# Patient Record
Sex: FEMALE | Race: WHITE | Employment: OTHER | URBAN - METROPOLITAN AREA
[De-identification: names, ages, dates, MRNs, and addresses within clinical notes are randomized per-mention and may not be internally consistent; named-entity substitution may affect disease eponyms.]

---

## 2017-03-22 ENCOUNTER — HOSPITAL ENCOUNTER (OUTPATIENT)
Dept: MAMMOGRAPHY | Age: 72
Discharge: HOME | End: 2017-03-22
Attending: FAMILY MEDICINE
Payer: MEDICARE

## 2017-03-22 DIAGNOSIS — Z12.31 VISIT FOR SCREENING MAMMOGRAM: ICD-10-CM

## 2017-03-22 PROCEDURE — 77067 SCR MAMMO BI INCL CAD: CPT

## 2018-11-16 ENCOUNTER — OFFICE VISIT (OUTPATIENT)
Dept: SURGERY | Age: 73
End: 2018-11-16

## 2018-11-16 VITALS
HEIGHT: 66 IN | DIASTOLIC BLOOD PRESSURE: 98 MMHG | HEART RATE: 69 BPM | TEMPERATURE: 97.2 F | WEIGHT: 204.7 LBS | RESPIRATION RATE: 20 BRPM | BODY MASS INDEX: 32.9 KG/M2 | OXYGEN SATURATION: 99 % | SYSTOLIC BLOOD PRESSURE: 169 MMHG

## 2018-11-16 DIAGNOSIS — L98.9 SKIN LESION OF RIGHT LOWER EXTREMITY: Primary | ICD-10-CM

## 2018-11-16 RX ORDER — POTASSIUM &MAGNESIUM ASPARTATE 250-250 MG
500 CAPSULE ORAL DAILY
COMMUNITY

## 2018-11-16 NOTE — PROGRESS NOTES
Surgery Consult:  Skin cancer  Requesting physician:  Dr. Eneida Marti    Subjective:   Patient 75 y.o.  female presents with right thigh skin lesion that she had for few years. Complains of intermittent bleeding. Patient is not sure whether the lesion has increased in size or not. Denies any trauma to this area.          Past Medical & Surgical History:  Past Medical History:   Diagnosis Date    Conjunctivitis     Edema     History of Bell's palsy 3/30/2010    Obesity, unspecified     paranoid schizophrenia     Parkinson's disease     Psychiatric disorder     schizoaffective    urinary incontinence     urinary incontinence    Varicose veins       Past Surgical History:   Procedure Laterality Date    HX DILATION AND CURETTAGE         Social History:  Social History     Socioeconomic History    Marital status:      Spouse name: Not on file    Number of children: Not on file    Years of education: Not on file    Highest education level: Not on file   Social Needs    Financial resource strain: Not on file    Food insecurity - worry: Not on file    Food insecurity - inability: Not on file   Qnekt needs - medical: Not on file   Qnekt needs - non-medical: Not on file   Occupational History    Not on file   Tobacco Use    Smoking status: Never Smoker    Smokeless tobacco: Never Used   Substance and Sexual Activity    Alcohol use: No    Drug use: Yes     Types: Prescription    Sexual activity: Not on file   Other Topics Concern     Service No    Blood Transfusions No    Caffeine Concern Not Asked    Occupational Exposure Not Asked    Hobby Hazards Not Asked    Sleep Concern Not Asked    Stress Concern Not Asked    Weight Concern Not Asked    Special Diet Not Asked    Back Care Not Asked    Exercise Not Asked    Bike Helmet Not Asked    Seat Belt Not Asked    Self-Exams Not Asked   Social History Narrative    Not on file        Family History:  Family History   Problem Relation Age of Onset    Breast Cancer Maternal Grandmother         Medications:  Current Outpatient Medications   Medication Sig    mirabegron (MYRBETRIQ PO) Take 25 mg by mouth daily.  cranberry 500 mg capsule Take 500 mg by mouth daily.  perphenazine (TRILAFON) 8 mg tablet Take 8 mg by mouth three (3) times daily. 16 mg in am     16 at 2 pm  8 mg hs     perphenazine (,TRILAFON,TABLET) 4 mg tablet Take 2 Tabs by mouth three (3) times daily. Takes 2 in AM, 2 in PM, and 2 at HS    fluoxetine (PROZAC) 20 mg capsule Take 60 mg by mouth daily. Take 3 at HS     benztropine (COGENTIN) 0.5 mg tablet Take 1 mg by mouth three (3) times daily. Takes 1 mg TID     amoxicillin-clavulanate (AUGMENTIN) 500-125 mg per tablet Take 1 Tab by mouth two (2) times a day.  HYDROcodone-acetaminophen (LORTAB 7.5) 7.5-500 mg per tablet Take 1 Tab by mouth every four (4) hours as needed for Pain.  guaiFENesin (ROBITUSSIN) 100 mg/5 mL liquid Take 100 mg by mouth three (3) times daily as needed.  CARBOXYMETHYLCELLULOS/GLYCERIN (OPTIVE OP) Apply  to eye three (3) times daily.  amitriptyline (ELAVIL) 75 mg tablet Take  by mouth nightly. No current facility-administered medications for this visit. Allergies: Allergies   Allergen Reactions    Haldol [Haloperidol Lactate] Other (comments)     Did not like how it made  Her feel       Review of Systems  A comprehensive review of systems was negative except for that written in the HPI.     Objective:     Exam:    Visit Vitals  BP (!) 169/98 (BP 1 Location: Left arm, BP Patient Position: Sitting)   Pulse 69   Temp 97.2 °F (36.2 °C) (Oral)   Resp 20   Ht 5' 6\" (1.676 m)   Wt 92.9 kg (204 lb 11.2 oz)   LMP  (LMP Unknown)   SpO2 99%   BMI 33.04 kg/m²     General appearance: alert, cooperative, no distress, appears stated age  Lungs: clear to auscultation bilaterally  Heart: regular rate and rhythm  Extremities: extremities normal, atraumatic, no cyanosis or edema. OCTAVIA. Skin: Skin color, texture, turgor normal.  Right anterior mid thigh with 1.5 x 1.5 cm friable non-blanching erythematous skin lesion with raised edges. Neurologic: Grossly normal    Assessment:     Right thigh skin lesion, suspicious for malignancy    Plan:     Plan for excisional biopsy  Risks, benefit, and alternative to surgery was discussed with the patient. The risks of surgery include but not limited to infection, bleeding, recurrence, and the risks of anesthetic. Possible need for re-excision based on final pathology also discussed. Patient is agreeable to surgery, but will call back to schedule surgery after checking with her power of . All questions answered.

## 2018-11-16 NOTE — H&P (VIEW-ONLY)
Surgery Consult:  Skin cancer Requesting physician:  Dr. Valentino rGeen Subjective:  
Patient 75 y.o.  female presents with right thigh skin lesion that she had for few years. Complains of intermittent bleeding. Patient is not sure whether the lesion has increased in size or not. Denies any trauma to this area. Past Medical & Surgical History: 
Past Medical History:  
Diagnosis Date  Conjunctivitis  Edema  History of Bell's palsy 3/30/2010  Obesity, unspecified  paranoid schizophrenia  Parkinson's disease  Psychiatric disorder   
 schizoaffective  urinary incontinence   
 urinary incontinence  Varicose veins Past Surgical History:  
Procedure Laterality Date  HX DILATION AND CURETTAGE Social History: 
Social History Socioeconomic History  Marital status:  Spouse name: Not on file  Number of children: Not on file  Years of education: Not on file  Highest education level: Not on file Social Needs  Financial resource strain: Not on file  Food insecurity - worry: Not on file  Food insecurity - inability: Not on file  Transportation needs - medical: Not on file  Transportation needs - non-medical: Not on file Occupational History  Not on file Tobacco Use  Smoking status: Never Smoker  Smokeless tobacco: Never Used Substance and Sexual Activity  Alcohol use: No  
 Drug use: Yes Types: Prescription  Sexual activity: Not on file Other Topics Concern   Service No  
 Blood Transfusions No  
 Caffeine Concern Not Asked  Occupational Exposure Not Asked [place] Hazards Not Asked  Sleep Concern Not Asked  Stress Concern Not Asked  Weight Concern Not Asked  Special Diet Not Asked  Back Care Not Asked  Exercise Not Asked  Bike Helmet Not Asked  Seat Belt Not Asked  Self-Exams Not Asked Social History Narrative  Not on file Family History: Family History Problem Relation Age of Onset  Breast Cancer Maternal Grandmother Medications: 
Current Outpatient Medications Medication Sig  
 mirabegron (MYRBETRIQ PO) Take 25 mg by mouth daily.  cranberry 500 mg capsule Take 500 mg by mouth daily.  perphenazine (TRILAFON) 8 mg tablet Take 8 mg by mouth three (3) times daily. 16 mg in am     16 at 2 pm  8 mg hs  perphenazine (,TRILAFON,TABLET) 4 mg tablet Take 2 Tabs by mouth three (3) times daily. Takes 2 in AM, 2 in PM, and 2 at HS  
 fluoxetine (PROZAC) 20 mg capsule Take 60 mg by mouth daily. Take 3 at HS   
 benztropine (COGENTIN) 0.5 mg tablet Take 1 mg by mouth three (3) times daily. Takes 1 mg TID  amoxicillin-clavulanate (AUGMENTIN) 500-125 mg per tablet Take 1 Tab by mouth two (2) times a day.  HYDROcodone-acetaminophen (LORTAB 7.5) 7.5-500 mg per tablet Take 1 Tab by mouth every four (4) hours as needed for Pain.  guaiFENesin (ROBITUSSIN) 100 mg/5 mL liquid Take 100 mg by mouth three (3) times daily as needed.  CARBOXYMETHYLCELLULOS/GLYCERIN (OPTIVE OP) Apply  to eye three (3) times daily.  amitriptyline (ELAVIL) 75 mg tablet Take  by mouth nightly. No current facility-administered medications for this visit. Allergies: Allergies Allergen Reactions  Haldol [Haloperidol Lactate] Other (comments) Did not like how it made  Her feel Review of Systems A comprehensive review of systems was negative except for that written in the HPI. Objective:  
 
Exam: 
 
Visit Vitals BP (!) 169/98 (BP 1 Location: Left arm, BP Patient Position: Sitting) Pulse 69 Temp 97.2 °F (36.2 °C) (Oral) Resp 20 Ht 5' 6\" (1.676 m) Wt 92.9 kg (204 lb 11.2 oz) LMP  (LMP Unknown) SpO2 99% BMI 33.04 kg/m² General appearance: alert, cooperative, no distress, appears stated age Lungs: clear to auscultation bilaterally Heart: regular rate and rhythm Extremities: extremities normal, atraumatic, no cyanosis or edema. OCTAVIA. Skin: Skin color, texture, turgor normal.  Right anterior mid thigh with 1.5 x 1.5 cm friable non-blanching erythematous skin lesion with raised edges. Neurologic: Grossly normal 
 
Assessment:  
 
Right thigh skin lesion, suspicious for malignancy Plan:  
 
Plan for excisional biopsy Risks, benefit, and alternative to surgery was discussed with the patient. The risks of surgery include but not limited to infection, bleeding, recurrence, and the risks of anesthetic. Possible need for re-excision based on final pathology also discussed. Patient is agreeable to surgery, but will call back to schedule surgery after checking with her power of . All questions answered.

## 2018-11-16 NOTE — PROGRESS NOTES
Chief Complaint   Patient presents with    Skin Problem     Pt seen @ the request of Dr. Bonnie Moreno to evaluate slin lesion right thigh. 1. Have you been to the ER, urgent care clinic since your last visit?no  Hospitalized since your last visit?no    2. Have you seen or consulted any other health care providers outside of the [address] [place] [address] since your last visit? Include any pap smears or colon screening. no     Discussed advanced directive. Patient states that she does  have an advanced directive.  [address]

## 2018-11-26 ENCOUNTER — TELEPHONE (OUTPATIENT)
Dept: SURGERY | Age: 73
End: 2018-11-26

## 2018-11-27 NOTE — TELEPHONE ENCOUNTER
Returned call to Ms [facility] . Left voice message. Surgery rescheduled to 12/6/18 for 10 AM. PAT rescheduled for 12/3/18 at 1 PM.    Ms Radha Lawrence, will call back to office with any scheduling conflict.

## 2018-11-29 DIAGNOSIS — Z01.818 PREOP TESTING: ICD-10-CM

## 2018-11-29 DIAGNOSIS — L98.9 SKIN LESION: ICD-10-CM

## 2018-11-29 DIAGNOSIS — L98.9 SKIN LESION: Primary | ICD-10-CM

## 2018-12-03 ENCOUNTER — HOSPITAL ENCOUNTER (OUTPATIENT)
Dept: NON INVASIVE DIAGNOSTICS | Age: 73
Discharge: HOME | End: 2018-12-03
Payer: MEDICARE

## 2018-12-03 DIAGNOSIS — Z01.818 PREOP TESTING: ICD-10-CM

## 2018-12-03 DIAGNOSIS — L98.9 SKIN LESION: ICD-10-CM

## 2018-12-03 LAB
ATRIAL RATE: 63 BPM
CALCULATED P AXIS, ECG09: 55 DEGREES
CALCULATED R AXIS, ECG10: -40 DEGREES
CALCULATED T AXIS, ECG11: 9 DEGREES
DIAGNOSIS, 93000: NORMAL
P-R INTERVAL, ECG05: 200 MS
Q-T INTERVAL, ECG07: 450 MS
QRS DURATION, ECG06: 80 MS
QTC CALCULATION (BEZET), ECG08: 460 MS
VENTRICULAR RATE, ECG03: 63 BPM

## 2018-12-03 PROCEDURE — 93005 ELECTROCARDIOGRAM TRACING: CPT

## 2018-12-04 LAB
BASOPHILS # BLD AUTO: 0.1 X10E3/UL (ref 0–0.2)
BASOPHILS NFR BLD AUTO: 1 %
BUN SERPL-MCNC: 25 MG/DL (ref 8–27)
BUN/CREAT SERPL: 18 (ref 12–28)
CALCIUM SERPL-MCNC: 9.2 MG/DL (ref 8.7–10.3)
CHLORIDE SERPL-SCNC: 102 MMOL/L (ref 96–106)
CO2 SERPL-SCNC: 23 MMOL/L (ref 20–29)
CREAT SERPL-MCNC: 1.37 MG/DL (ref 0.57–1)
EOSINOPHIL # BLD AUTO: 0.7 X10E3/UL (ref 0–0.4)
EOSINOPHIL NFR BLD AUTO: 9 %
ERYTHROCYTE [DISTWIDTH] IN BLOOD BY AUTOMATED COUNT: 12.9 % (ref 12.3–15.4)
GLUCOSE SERPL-MCNC: 101 MG/DL (ref 65–99)
HCT VFR BLD AUTO: 40.2 % (ref 34–46.6)
HGB BLD-MCNC: 13 G/DL (ref 11.1–15.9)
IMM GRANULOCYTES # BLD: 0 X10E3/UL (ref 0–0.1)
IMM GRANULOCYTES NFR BLD: 0 %
LYMPHOCYTES # BLD AUTO: 1.6 X10E3/UL (ref 0.7–3.1)
LYMPHOCYTES NFR BLD AUTO: 21 %
MCH RBC QN AUTO: 29.9 PG (ref 26.6–33)
MCHC RBC AUTO-ENTMCNC: 32.3 G/DL (ref 31.5–35.7)
MCV RBC AUTO: 92 FL (ref 79–97)
MONOCYTES # BLD AUTO: 0.8 X10E3/UL (ref 0.1–0.9)
MONOCYTES NFR BLD AUTO: 11 %
NEUTROPHILS # BLD AUTO: 4.5 X10E3/UL (ref 1.4–7)
NEUTROPHILS NFR BLD AUTO: 58 %
PLATELET # BLD AUTO: 261 X10E3/UL (ref 150–379)
POTASSIUM SERPL-SCNC: 4.5 MMOL/L (ref 3.5–5.2)
RBC # BLD AUTO: 4.35 X10E6/UL (ref 3.77–5.28)
SODIUM SERPL-SCNC: 143 MMOL/L (ref 134–144)
WBC # BLD AUTO: 7.7 X10E3/UL (ref 3.4–10.8)

## 2018-12-05 ENCOUNTER — ANESTHESIA EVENT (OUTPATIENT)
Dept: SURGERY | Age: 73
End: 2018-12-05
Payer: MEDICARE

## 2018-12-05 NOTE — ANESTHESIA PREPROCEDURE EVALUATION
Anesthetic History   No history of anesthetic complications            Review of Systems / Medical History  Patient summary reviewed, nursing notes reviewed and pertinent labs reviewed    Pulmonary  Within defined limits                 Neuro/Psych         Psychiatric history    Comments: Paranoid Schizophrenia  H/O Bell's Palsy  Parkinson's Disease Cardiovascular  Within defined limits                Exercise tolerance: >4 METS  Comments: Varicose Veins   GI/Hepatic/Renal               Comments: Urinary Incontinence Endo/Other        Obesity  Pertinent negatives: No morbid obesity   Other Findings   Comments: Right lower extremity skin lesion         Physical Exam    Airway  Mallampati: II  TM Distance: 4 - 6 cm  Neck ROM: normal range of motion   Mouth opening: Normal     Cardiovascular  Regular rate and rhythm,  S1 and S2 normal,  no murmur, click, rub, or gallop             Dental    Dentition: Edentulous     Pulmonary  Breath sounds clear to auscultation               Abdominal  GI exam deferred       Other Findings            Anesthetic Plan    ASA: 3  Anesthesia type: MAC and total IV anesthesia          Induction: Intravenous  Anesthetic plan and risks discussed with: Patient

## 2018-12-05 NOTE — PERIOP NOTES
Spoke w  Mrs. Beatriz Conroy. She unsure of patient medical history and meds, referred me to nurse @ [facility].

## 2018-12-05 NOTE — PERIOP NOTES
[facility]  Preoperative Instructions        Surgery Date 12/6/18          Time of Arrival 0845    1. On the day of your surgery, please report to the Surgical Services Registration Desk and sign in at your designated time. The Surgery Center is located to the right of the Emergency Room. 2. You must have someone with you to drive you home. You should not drive a car for 24 hours following surgery. Please make arrangements for a friend or family member to stay with you for the first 24 hours after your surgery. 3. Do not have anything to eat or drink (including water, gum, mints, coffee, juice) after midnight 12/5/18?? Clerance Riis ? This may not apply to medications prescribed by your physician. ?(Please note below the special instructions with medications to take the morning of your procedure.)    4. We recommend you do not drink any alcoholic beverages for 24 hours before and after your surgery. 5. Contact your surgeons office for instructions on the following medications: non-steroidal anti-inflammatory drugs (i.e. Advil, Aleve), vitamins, and supplements. (Some surgeons will want you to stop these medications prior to surgery and others may allow you to take them)  **If you are currently taking Plavix, Coumadin, Aspirin and/or other blood-thinning agents, contact your surgeon for instructions. ** Your surgeon will partner with the physician prescribing these medications to determine if it is safe to stop or if you need to continue taking. Please do not stop taking these medications without instructions from your surgeon    6. Wear comfortable clothes. Wear glasses instead of contacts. Do not bring any money or jewelry. Please bring picture ID, insurance card, and any prearranged co-payment or hospital payment. Do not wear make-up, particularly mascara the morning of your surgery. Do not wear nail polish, particularly if you are having foot /hand surgery.   Wear your hair loose or down, no ponytails, buns, troy pins or clips. All body piercings must be removed. Please shower with antibacterial soap for three consecutive days before and on the morning of surgery, but do not apply any lotions, powders or deodorants after the shower on the day of surgery. Please use a fresh towels after each shower. Please sleep in clean clothes and change bed linens the night before surgery. Please do not shave for 48 hours prior to surgery. Shaving of the face is acceptable. 7. You should understand that if you do not follow these instructions your surgery may be cancelled. If your physical condition changes (I.e. fever, cold or flu) please contact your surgeon as soon as possible. 8. It is important that you be on time. If a situation occurs where you may be late, please call [phone] (OR Holding Area). 9. If you have any questions and or problems, please call [phone] (Pre-admission Testing). 10. Your surgery time may be subject to change. You will receive a phone call the evening prior if your time changes. 11.  If having outpatient surgery, you must have someone to drive you here, stay with you during the duration of your stay, and to drive you home at time of discharge. 12.   In an effort to improve the efficiency, privacy, and safety for all of our Pre-op patients visitors are not allowed in the Holding area. Once you arrive and are registered your family/visitors will be asked to remain in the waiting room. The Pre-op staff will get you from the Surgical Waiting Area and will explain to you and your family/visitors that the Pre-op phase is beginning. The staff will answer any questions and provide instructions for tracking of the patient, by use of the existing tracking number and color-coded status board in the waiting room.   At this time the staff will also ask for your designated spokesperson information in the event that the physician or staff need to provide an update or obtain any pertinent information. The designated spokesperson will be notified if the physician needs to speak to family during the pre-operative phase. If at any time your family/visitors has questions or concerns they may approach the volunteer desk in the waiting area for assistance. Special Instructions:none    MEDICATIONS TO TAKE THE MORNING OF SURGERY WITH A SIP OF WATER:myrbetriq, trilafon,prozac,cogentin. I understand a pre-operative phone call will be made to verify my surgery time. In the event that I am not available, I give permission for a message to be left on my answering service and/or with another person?   {yes @ [phone] @ [phone].         ___________________      __________   _________    (Signature of Patient)             (Witness)                (Date and Time)

## 2018-12-06 ENCOUNTER — ANESTHESIA (OUTPATIENT)
Dept: SURGERY | Age: 73
End: 2018-12-06
Payer: MEDICARE

## 2018-12-06 ENCOUNTER — HOSPITAL ENCOUNTER (OUTPATIENT)
Age: 73
Setting detail: OUTPATIENT SURGERY
Discharge: HOME | End: 2018-12-06
Attending: SURGERY | Admitting: SURGERY
Payer: MEDICARE

## 2018-12-06 VITALS
DIASTOLIC BLOOD PRESSURE: 83 MMHG | HEART RATE: 58 BPM | HEIGHT: 66 IN | WEIGHT: 205.25 LBS | TEMPERATURE: 97.6 F | RESPIRATION RATE: 16 BRPM | BODY MASS INDEX: 32.99 KG/M2 | SYSTOLIC BLOOD PRESSURE: 153 MMHG | OXYGEN SATURATION: 97 %

## 2018-12-06 DIAGNOSIS — R52 PAIN: Primary | ICD-10-CM

## 2018-12-06 PROCEDURE — 74011250636 HC RX REV CODE- 250/636: Performed by: ANESTHESIOLOGY

## 2018-12-06 PROCEDURE — 77030032490 HC SLV COMPR SCD KNE COVD -B: Performed by: SURGERY

## 2018-12-06 PROCEDURE — 77030031139 HC SUT VCRL2 J&J -A: Performed by: SURGERY

## 2018-12-06 PROCEDURE — 76010000138 HC OR TIME 0.5 TO 1 HR: Performed by: SURGERY

## 2018-12-06 PROCEDURE — 76060000032 HC ANESTHESIA 0.5 TO 1 HR: Performed by: SURGERY

## 2018-12-06 PROCEDURE — 77030011640 HC PAD GRND REM COVD -A: Performed by: SURGERY

## 2018-12-06 PROCEDURE — 74011250636 HC RX REV CODE- 250/636: Performed by: SURGERY

## 2018-12-06 PROCEDURE — 74011250636 HC RX REV CODE- 250/636

## 2018-12-06 PROCEDURE — 76210000020 HC REC RM PH II FIRST 0.5 HR: Performed by: SURGERY

## 2018-12-06 PROCEDURE — 77030018836 HC SOL IRR NACL ICUM -A: Performed by: SURGERY

## 2018-12-06 PROCEDURE — 88305 TISSUE EXAM BY PATHOLOGIST: CPT

## 2018-12-06 PROCEDURE — 74011000250 HC RX REV CODE- 250: Performed by: SURGERY

## 2018-12-06 PROCEDURE — 76210000006 HC OR PH I REC 0.5 TO 1 HR: Performed by: SURGERY

## 2018-12-06 PROCEDURE — 77030039266 HC ADH SKN EXOFIN S2SG -A: Performed by: SURGERY

## 2018-12-06 PROCEDURE — 77030003029 HC SUT VCRL J&J -B: Performed by: SURGERY

## 2018-12-06 RX ORDER — SODIUM CHLORIDE, SODIUM LACTATE, POTASSIUM CHLORIDE, CALCIUM CHLORIDE 600; 310; 30; 20 MG/100ML; MG/100ML; MG/100ML; MG/100ML
25 INJECTION, SOLUTION INTRAVENOUS CONTINUOUS
Status: DISCONTINUED | OUTPATIENT
Start: 2018-12-06 | End: 2018-12-06 | Stop reason: HOSPADM

## 2018-12-06 RX ORDER — CEFAZOLIN SODIUM/WATER 2 G/20 ML
2 SYRINGE (ML) INTRAVENOUS
Status: COMPLETED | OUTPATIENT
Start: 2018-12-06 | End: 2018-12-06

## 2018-12-06 RX ORDER — BUPIVACAINE HYDROCHLORIDE AND EPINEPHRINE 5; 5 MG/ML; UG/ML
INJECTION, SOLUTION EPIDURAL; INTRACAUDAL; PERINEURAL AS NEEDED
Status: DISCONTINUED | OUTPATIENT
Start: 2018-12-06 | End: 2018-12-06 | Stop reason: HOSPADM

## 2018-12-06 RX ORDER — SODIUM CHLORIDE 0.9 % (FLUSH) 0.9 %
5-10 SYRINGE (ML) INJECTION AS NEEDED
Status: DISCONTINUED | OUTPATIENT
Start: 2018-12-06 | End: 2018-12-06 | Stop reason: HOSPADM

## 2018-12-06 RX ORDER — FENTANYL CITRATE 50 UG/ML
25 INJECTION, SOLUTION INTRAMUSCULAR; INTRAVENOUS
Status: DISCONTINUED | OUTPATIENT
Start: 2018-12-06 | End: 2018-12-06 | Stop reason: HOSPADM

## 2018-12-06 RX ORDER — PROPOFOL 10 MG/ML
INJECTION, EMULSION INTRAVENOUS AS NEEDED
Status: DISCONTINUED | OUTPATIENT
Start: 2018-12-06 | End: 2018-12-06 | Stop reason: HOSPADM

## 2018-12-06 RX ORDER — DIPHENHYDRAMINE HYDROCHLORIDE 50 MG/ML
12.5 INJECTION, SOLUTION INTRAMUSCULAR; INTRAVENOUS AS NEEDED
Status: DISCONTINUED | OUTPATIENT
Start: 2018-12-06 | End: 2018-12-06 | Stop reason: HOSPADM

## 2018-12-06 RX ORDER — LIDOCAINE HYDROCHLORIDE 10 MG/ML
0.1 INJECTION, SOLUTION EPIDURAL; INFILTRATION; INTRACAUDAL; PERINEURAL AS NEEDED
Status: DISCONTINUED | OUTPATIENT
Start: 2018-12-06 | End: 2018-12-06 | Stop reason: HOSPADM

## 2018-12-06 RX ORDER — PROPOFOL 10 MG/ML
INJECTION, EMULSION INTRAVENOUS
Status: DISCONTINUED | OUTPATIENT
Start: 2018-12-06 | End: 2018-12-06 | Stop reason: HOSPADM

## 2018-12-06 RX ORDER — HYDROCODONE BITARTRATE AND ACETAMINOPHEN 5; 325 MG/1; MG/1
1 TABLET ORAL
Qty: 25 TAB | Refills: 0 | Status: SHIPPED | OUTPATIENT
Start: 2018-12-06

## 2018-12-06 RX ORDER — HYDROMORPHONE HYDROCHLORIDE 1 MG/ML
0.2 INJECTION, SOLUTION INTRAMUSCULAR; INTRAVENOUS; SUBCUTANEOUS
Status: DISCONTINUED | OUTPATIENT
Start: 2018-12-06 | End: 2018-12-06 | Stop reason: HOSPADM

## 2018-12-06 RX ORDER — MIDAZOLAM HYDROCHLORIDE 1 MG/ML
INJECTION, SOLUTION INTRAMUSCULAR; INTRAVENOUS AS NEEDED
Status: DISCONTINUED | OUTPATIENT
Start: 2018-12-06 | End: 2018-12-06 | Stop reason: HOSPADM

## 2018-12-06 RX ORDER — SODIUM CHLORIDE 0.9 % (FLUSH) 0.9 %
5-10 SYRINGE (ML) INJECTION EVERY 8 HOURS
Status: DISCONTINUED | OUTPATIENT
Start: 2018-12-06 | End: 2018-12-06 | Stop reason: HOSPADM

## 2018-12-06 RX ADMIN — PROPOFOL 40 MG: 10 INJECTION, EMULSION INTRAVENOUS at 10:53

## 2018-12-06 RX ADMIN — SODIUM CHLORIDE, SODIUM LACTATE, POTASSIUM CHLORIDE, AND CALCIUM CHLORIDE 25 ML/HR: 600; 310; 30; 20 INJECTION, SOLUTION INTRAVENOUS at 10:05

## 2018-12-06 RX ADMIN — PROPOFOL 40 MCG/KG/MIN: 10 INJECTION, EMULSION INTRAVENOUS at 10:53

## 2018-12-06 RX ADMIN — MIDAZOLAM HYDROCHLORIDE 1 MG: 1 INJECTION, SOLUTION INTRAMUSCULAR; INTRAVENOUS at 10:40

## 2018-12-06 RX ADMIN — Medication 2 G: at 10:55

## 2018-12-06 NOTE — INTERVAL H&P NOTE
H&P Update:  Abi Ahuja was seen and examined. History and physical has been reviewed. The patient has been examined.  There have been no significant clinical changes since the completion of the originally dated History and Physical.    Signed By: Robyn Marte MD     December 6, 2018 9:05 AM

## 2018-12-06 NOTE — ANESTHESIA POSTPROCEDURE EVALUATION
Procedure(s):  EXCISION BIOPSY RIGHT THIGH LESION. Anesthesia Post Evaluation        Patient location during evaluation: PACU  Note status: Adequate. Level of consciousness: responsive to verbal stimuli and sleepy but conscious  Pain management: satisfactory to patient  Airway patency: patent  Anesthetic complications: no  Cardiovascular status: acceptable  Respiratory status: acceptable  Hydration status: acceptable  Comments: +Post-Anesthesia Evaluation and Assessment    Patient: Clive Martinez MRN: [id number]  SSN: [id number]   YOB: 1945  Age: 75 y.o. Sex: female      Cardiovascular Function/Vital Signs    /84   Pulse (!) 59   Temp 36.4 °C (97.5 °F)   Resp 14   Ht 5' 6\" (1.676 m)   Wt 93.1 kg (205 lb 4 oz)   SpO2 97%   BMI 33.13 kg/m²     Patient is status post Procedure(s):  EXCISION BIOPSY RIGHT THIGH LESION. Nausea/Vomiting: Controlled. Postoperative hydration reviewed and adequate. Pain:  Pain Scale 1: Numeric (0 - 10) (12/06/18 1132)  Pain Intensity 1: 0 (12/06/18 1132)   Managed. Neurological Status:   Neuro (WDL): Exceptions to WDL (12/06/18 1132)   At baseline. Mental Status and Level of Consciousness: Arousable. Pulmonary Status:   O2 Device: Nasal cannula (12/06/18 1150)   Adequate oxygenation and airway patent. Complications related to anesthesia: None    Post-anesthesia assessment completed. No concerns.     Signed By: Bill Medellin MD    12/6/2018  Post anesthesia nausea and vomiting:  controlled      Visit Vitals  /84   Pulse (!) 59   Temp 36.4 °C (97.5 °F)   Resp 14   Ht 5' 6\" (1.676 m)   Wt 93.1 kg (205 lb 4 oz)   SpO2 97%   BMI 33.13 kg/m²

## 2018-12-06 NOTE — OP NOTES
Patient ID:   Name: Beba Rivera   Medical Record Number: [id number]   YOB: 1945    Date of Surgery: 12/6/2018     Preoperative Diagnosis:   Right thigh skin lesion suspicious for malignancy    Postoperative Diagnosis: Same as preoperative diagnosis. Procedures:  Excisional biopsy of the right thigh skin lesion    Surgeon: Jessika De Guzman MD     Surgical assistant:  DIRK Mazariegos    Anesthesia:  MAC    Estimated Blood Loss:   5 cc    Implants:  None              Specimens:  Right thigh skin lesion    Complication: None    Indications:   Patient 75 y.o.  female presents with right thigh skin lesion that she had for few years which bleeds intermittently. Patient presents today for excisional biopsy.      Procedure Details: The patient was seen in the Holding Room. The risks, benefits, complications, treatment options, and expected outcomes were discussed with the patient. After informed consent was performed, patient was taken to the operating room and was placed supine in the operating table. After establishing MAC anesthesia, right anterior thigh was prepped and draped in standard surgical fashion. A 6 cm elliptical incision was made around the raised skin lesion measuring 1.8 x 0.8 cm and the lesion was excised using electrocautery. Specimen was sent off to the pathology. Wound was irrigated and good hemostasis was obtained. Incision was then closed in two layers. Subcutaneous layer was approximated using interrupted 3-0 Vicryl and skin was closed with 4-0 Vicryl subcuticular stitch. Dermabond was then applied. Instrument, sponge, and needle counts were correct prior to closure and at the conclusion of the case. The patient was taken to recovery room in good condition having tolerated the procedure well.       CC: Kel Bhatti MD

## 2018-12-06 NOTE — DISCHARGE INSTRUCTIONS
Patient Discharge Instructions    Devora Burton / [id number] : 1945    Admitted 2018 Discharged: 2018         What to do at Home    Recommended diet: Regular Diet    Recommended activity: no strenuous exercises x 2 weeks; no driving while taking narcotics for pain. May take shower, but no bath x 2 weeks. Keep ice over the incision to minimize swelling. Please take over the counter stool softener or miralax while taking narcotics for pain. If you experience a lot of drainage, develop redness around the wound, or a fever over 101 F occurs please call the office. Narcotics and anesthesia sometimes cause nausea and vomiting. If persistent please call the office. Do not hesitate to call with questions or concerns. Follow-up with Dr. Vish Hardwick in 2 weeks. Please call [phone] for an appointment. Information obtained by :  I understand that if any problems occur once I am at home I am to contact my physician. I understand and acknowledge receipt of the instructions indicated above.                                                                                                                                                Physician's or R.N.'s Signature                                                                  Date/Time                                                                                                                                              Patient or Representative Signature                                                          Date/Time      DISCHARGE SUMMARY from Nurse    PATIENT INSTRUCTIONS:    After general anesthesia or intravenous sedation, for 24 hours or while taking prescription Narcotics:  · Limit your activities  · Do not drive and operate hazardous machinery  · Do not make important personal or business decisions  · Do  not drink alcoholic beverages  · If you have not urinated within 8 hours after discharge, please contact your surgeon on call. Report the following to your surgeon:  · Excessive pain, swelling, redness or odor of or around the surgical area  · Temperature over 100.5  · Nausea and vomiting lasting longer than 4 hours or if unable to take medications  · Any signs of decreased circulation or nerve impairment to extremity: change in color, persistent  numbness, tingling, coldness or increase pain  · Any questions    What to do at Home:  A common side effect of anesthesia following surgery is nausea and/or vomiting. In order to decrease symptoms, it is wise to avoid foods that are high in fat, greasy foods, milk products, and spicy foods for the first 24 hours. Acceptable foods for the first 24 hours following surgery include but are not limited to:     soup   broth    toast    crackers    applesauce    bananas    mashed potatoes,   soft or scrambled eggs   oatmeal    jello    It is important to eat when taking your pain medication. This will help to prevent nausea. If possible, please try to time your meals with your medications. It is very important to stay hydrated following surgery. Sip fluids frequently while awake. Avoid acidic drinks such as citrus juices and soda for 24 hours. Carbonated beverages may cause bloating and gas. Acceptable fluids include:    - water (flavor packets may add variety)  - coffee or tea (in moderation)  - Gatorade  - Pancho-aid  - apple juice  - cranberry juice    You are encouraged to cough and deep breathe every hour when awake. This will help to prevent respiratory complications following anesthesia. You may want to hug a pillow when coughing and sneezing to add additional support to the surgical area and to decrease discomfort if you had abdominal or chest surgery. If you are discharged home with support stockings, you may remove them after 24 hours. Support stockings are used to help prevent blood clots in the legs following surgery.     Please take time to review all of your Home Care Instructions and Medication Information sheets provided in your discharge packet. If you have any questions, please contact your surgeons office. Thank you. How to Care for Your Wound After Its Treated With  DERMABOND* Topical Skin Adhesive  DERMABOND* Topical Skin Adhesive (2-octyl cyanoacrylate) is a sterile, liquid skin adhesive  that holds wound edges together. The film will usually remain in place for 5 to 10 days, then  naturally fall off your skin. The following will answer some of your questions and provide instructions for proper care for your  wound while it is healing:    CHECK WOUND APPEARANCE   Some swelling, redness, and pain are common with all wounds and normally will go away as the  wound heals. If swelling, redness, or pain increases or if the wound feels warm to the touch,  contact a doctor. Also contact a doctor if the wound edges reopen or separate. REPLACE BANDAGES   If your wound is bandaged, keep the bandage dry.  Replace the dressing daily until the adhesive film has fallen off or if the  bandage should become wet, unless otherwise instructed by your  physician.  When changing the dressing, do not place tape directly over the  DERMABOND adhesive film, because removing the tape later may also  remove the film. AVOID TOPICAL MEDICATIONS   Do not apply liquid or ointment medications or any other product to your wound while the  DERMABOND adhesive film is in place. These may loosen the film before your wound is healed. KEEP WOUND DRY AND PROTECTED   You may occasionally and briefly wet your wound in the shower or bath. Do not soak or scrub  your wound, do not swim, and avoid periods of heavy perspiration until the DERMABOND  adhesive has naturally fallen off. After showering or bathing, gently blot your wound dry with a  soft towel.  If a protective dressing is being used, apply a fresh, dry bandage, being sure to keep  the tape off the DERMABOND adhesive film.   Apply a clean, dry bandage over the wound if necessary to protect it.  Protect your wound from injury until the skin has had sufficient time to heal.   Do not scratch, rub, or pick at the DERMABOND adhesive film. This may loosen the film before  your wound is healed.  Protect the wound from prolonged exposure to sunlight or tanning lamps while the film is in  place. If you have any questions or concerns about this product, please consult your doctor. *[employer]ark ©[employer] 0261CI PREVENT AN INFECTION      1. [address] YOUR HANDS     To prevent infection, good handwashing is the most important thing you or your caregiver can do.  Wash your hands with soap and water or use the hand  we gave you before you touch any wounds. 2. SHOWER     Use the antibacterial soap we gave you when you take a shower.  Shower with this soap until your wounds are healed.  To reach all areas of your body, you may need someone to help you.  Dont forget to clean your belly button with every shower. 3.  USE CLEAN SHEETS     Use freshly cleaned sheets on your bed after surgery.  To keep the surgery site clean, do not allow pets to sleep with you while your wound is still healing. 4. STOP SMOKING     Stop smoking, or at least cut back on smoking     Smoking slows your healing. 5.  CONTROL YOUR BLOOD SUGAR     High blood sugars slow wound healing. If you are diabetic, control your blood sugar levels before and after your surgery. Please carry medication information at all times in case of emergency situations. Narcotic-Analgesic/Acetaminophen (Percocet, Norco, Lorcet HD, Lortab 10/325) - (By mouth)   Why this medicine is used:   Relieves pain.   Contact a nurse or doctor right away if you have:  · Extreme weakness, shallow breathing, slow heartbeat  · Severe confusion, lightheadedness, dizziness, fainting  · Yellow skin or eyes, dark urine or pale stools  · Severe constipation, severe stomach pain, nausea, vomiting, loss of appetite  · Sweating or cold, clammy skin     Common side effects:  · Mild constipation, nausea, vomiting  · Sleepiness, tiredness  · Itching, rash  © 2017 [facility] Information is for End User's use only and may not be sold, redistributed or otherwise used for commercial purposes. No smoking/ No tobacco products/ Avoid exposure to second hand smoke  Surgeon General's Warning:  Quitting smoking now greatly reduces serious risk to your health. Obesity, smoking, and sedentary lifestyle greatly increases your risk for illness    A healthy diet, regular physical exercise & weight monitoring are important for maintaining a healthy lifestyle    You may be retaining fluid if you have a history of heart failure or if you experience any of the following symptoms:  Weight gain of 3 pounds or more overnight or 5 pounds in a week, increased swelling in our hands or feet or shortness of breath while lying flat in bed. Please call your doctor as soon as you notice any of these symptoms; do not wait until your next office visit. Recognize signs and symptoms of STROKE:    F-face looks uneven    A-arms unable to move or move unevenly    S-speech slurred or non-existent    T-time-call 911 as soon as signs and symptoms begin-DO NOT go       Back to bed or wait to see if you get better-TIME IS BRAIN. Warning Signs of HEART ATTACK     Call 911 if you have these symptoms:   Chest discomfort. Most heart attacks involve discomfort in the center of the chest that lasts more than a few minutes, or that goes away and comes back. It can feel like uncomfortable pressure, squeezing, fullness, or pain.  Discomfort in other areas of the upper body. Symptoms can include pain or discomfort in one or both arms, the back, neck, jaw, or stomach.  Shortness of breath with or without chest discomfort.    Other signs may include breaking out in a cold sweat, nausea, or lightheadedness. Don't wait more than five minutes to call 911 - MINUTES MATTER! Fast action can save your life. Calling 911 is almost always the fastest way to get lifesaving treatment. Emergency Medical Services staff can begin treatment when they arrive -- up to an hour sooner than if someone gets to the hospital by car. The discharge information has been reviewed with the {PATIENT PARENT GUARDIAN:56923}. The {PATIENT PARENT GUARDIAN:02505} verbalized understanding. Discharge medications reviewed with the {[place] meds [place] [facility] [place]} and appropriate educational materials and side effects teaching were provided.   ___________________________________________________________________________________________________________________________________

## 2018-12-06 NOTE — PERIOP NOTES
TRANSFER - OUT REPORT:    Verbal report given to Govind, [address] (name[facility] ([facility]) for routine progression of care       Report consisted of patients Situation, Background, Assessment and   Recommendations(SBAR). Information from the following report(s) SBAR, OR Summary, MAR and Cardiac Rhythm nsr was reviewed with the receiving nurse. Opportunity for questions and clarification was provided.       Patient transported with:   Registered Nurse

## 2018-12-06 NOTE — PERIOP NOTES
Discharge instructions reviewed with patient, , and medical POA. Provided with one prescription. Opportunity for questions/answers given, able to verbalize understanding. Patient dressed with minimal assistance from nurse and SCT. Escorted out for discharge back to [facility] via wheelchair by nurse.

## 2018-12-06 NOTE — PERIOP NOTES
Report called to Anton Alvarenga at [facility] for continued care Vital sign assessment medicines prescriptions and discharge instructions given. Verbalized understanding.

## 2018-12-06 NOTE — PERIOP NOTES
Handoff Report from Operating Room to PACU    Report received from ESTEBAN Wood RN and Blanca Musa CRNA regarding Beba Rivera. Surgeon(s):  Sam Llanos MD  And Procedure(s) (LRB):  EXCISION BIOPSY RIGHT THIGH LESION (Right)  confirmed   with allergies and dressings discussed. Anesthesia type, drugs, patient history, complications, estimated blood loss, vital signs, intake and output, and last pain medication, lines, reversal medications and temperature were reviewed.

## 2018-12-18 ENCOUNTER — OFFICE VISIT (OUTPATIENT)
Dept: SURGERY | Age: 73
End: 2018-12-18

## 2018-12-18 VITALS
HEART RATE: 67 BPM | DIASTOLIC BLOOD PRESSURE: 88 MMHG | TEMPERATURE: 97.8 F | SYSTOLIC BLOOD PRESSURE: 143 MMHG | BODY MASS INDEX: 32.62 KG/M2 | HEIGHT: 66 IN | OXYGEN SATURATION: 96 % | WEIGHT: 203 LBS

## 2018-12-18 DIAGNOSIS — C44.92 SCC (SQUAMOUS CELL CARCINOMA): Primary | ICD-10-CM

## 2018-12-18 NOTE — PROGRESS NOTES
Patient is here for follow-up. 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 Doing well. No complaints. Path reviewed with the patient. PE:  Visit Vitals  /88 (BP 1 Location: Left arm, BP Patient Position: Sitting)   Pulse 67   Temp 97.8 °F (36.6 °C)   Ht 5' 6\" (1.676 m)   Wt 92.1 kg (203 lb)   LMP  (LMP Unknown)   SpO2 96%   BMI 32.77 kg/m²     Skin:  Right anterior thigh Inc C/D/I.     Assessment:  S/p excision of SCC from right thigh    Plan:  -F/u in 1 year

## 2018-12-18 NOTE — PROGRESS NOTES
Chief Complaint   Patient presents with    Follow-up     2 WK. F/U BIOPSY RT. THIGH 12/6/18     1. Have you been to the ER, urgent care clinic since your last visit? Hospitalized since your last visit? NO    2. Have you seen or consulted any other health care providers outside of the [address] [place] [address] since your last visit? Include any pap smears or colon screening.  NO

## 2019-12-16 ENCOUNTER — OFFICE VISIT (OUTPATIENT)
Dept: SURGERY | Age: 74
End: 2019-12-16

## 2019-12-16 VITALS
DIASTOLIC BLOOD PRESSURE: 80 MMHG | SYSTOLIC BLOOD PRESSURE: 129 MMHG | BODY MASS INDEX: 31.53 KG/M2 | OXYGEN SATURATION: 97 % | RESPIRATION RATE: 20 BRPM | HEART RATE: 100 BPM | HEIGHT: 66 IN | WEIGHT: 196.2 LBS | TEMPERATURE: 97.4 F

## 2019-12-16 DIAGNOSIS — C44.92 SCC (SQUAMOUS CELL CARCINOMA): Primary | ICD-10-CM

## 2019-12-16 RX ORDER — LORATADINE 10 MG/1
10 TABLET ORAL
COMMUNITY

## 2019-12-16 NOTE — Clinical Note
12/16/19 Patient: Brandon Dawn YOB: 1945 Date of Visit: 12/16/2019 Dimitris Matthews MD 
[facility] [address]  [address] VIA In Basket Dear Dimitris Matthews MD, Thank you for referring Ms. Gui Knight to [address] [place] [address] for evaluation. My notes for this consultation are attached. If you have questions, please do not hesitate to call me. I look forward to following your patient along with you.  
 
 
Sincerely, 
 
Kelli Red MD

## 2019-12-16 NOTE — PROGRESS NOTES
To: Shirley Santos MD    From: Gricelda Crowley MD    Thank you. Looks good. Happy [place]. Encounter Date: 12/16/2019    Subjective:      Tressie Oppenheim is a 75 y.o. female presents for postop care. The patient has no complaints. Objective:     General:  alert, cooperative, no distress, appears stated age   Incision:  Well healed. No new lesions. Right groin -- no palpable lymphadenopathy. 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 Juan Miguel Milton. Clinically BRITTANIE. Plan:     No further f/u for this diagnosis needed. Routine skin exams given history.       Gricelda Crowley MD

## 2019-12-19 ENCOUNTER — HOSPITAL ENCOUNTER (EMERGENCY)
Age: 74
Discharge: HOME | End: 2019-12-20
Attending: EMERGENCY MEDICINE
Payer: MEDICARE

## 2019-12-19 DIAGNOSIS — R11.2 NON-INTRACTABLE VOMITING WITH NAUSEA, UNSPECIFIED VOMITING TYPE: Primary | ICD-10-CM

## 2019-12-19 PROCEDURE — 99285 EMERGENCY DEPT VISIT HI MDM: CPT

## 2019-12-19 PROCEDURE — 96374 THER/PROPH/DIAG INJ IV PUSH: CPT

## 2019-12-20 VITALS
DIASTOLIC BLOOD PRESSURE: 93 MMHG | TEMPERATURE: 98 F | OXYGEN SATURATION: 99 % | HEIGHT: 63 IN | WEIGHT: 206.35 LBS | HEART RATE: 68 BPM | BODY MASS INDEX: 36.56 KG/M2 | RESPIRATION RATE: 20 BRPM | SYSTOLIC BLOOD PRESSURE: 145 MMHG

## 2019-12-20 LAB
ALBUMIN SERPL-MCNC: 3.7 G/DL (ref 3.5–5)
ALBUMIN/GLOB SERPL: 0.8 {RATIO} (ref 1.1–2.2)
ALP SERPL-CCNC: 129 U/L (ref 45–117)
ALT SERPL-CCNC: 31 U/L (ref 12–78)
ANION GAP SERPL CALC-SCNC: 5 MMOL/L (ref 5–15)
APPEARANCE UR: CLEAR
AST SERPL-CCNC: 23 U/L (ref 15–37)
BACTERIA URNS QL MICRO: NEGATIVE /HPF
BASOPHILS # BLD: 0.1 K/UL (ref 0–0.1)
BASOPHILS NFR BLD: 1 % (ref 0–1)
BILIRUB SERPL-MCNC: 0.3 MG/DL (ref 0.2–1)
BILIRUB UR QL: NEGATIVE
BUN SERPL-MCNC: 27 MG/DL (ref 6–20)
BUN/CREAT SERPL: 20 (ref 12–20)
CALCIUM SERPL-MCNC: 8.7 MG/DL (ref 8.5–10.1)
CHLORIDE SERPL-SCNC: 106 MMOL/L (ref 97–108)
CO2 SERPL-SCNC: 30 MMOL/L (ref 21–32)
COLOR UR: NORMAL
CREAT SERPL-MCNC: 1.34 MG/DL (ref 0.55–1.02)
DIFFERENTIAL METHOD BLD: NORMAL
EOSINOPHIL # BLD: 0.2 K/UL (ref 0–0.4)
EOSINOPHIL NFR BLD: 2 % (ref 0–7)
EPITH CASTS URNS QL MICRO: NORMAL /LPF
ERYTHROCYTE [DISTWIDTH] IN BLOOD BY AUTOMATED COUNT: 12.3 % (ref 11.5–14.5)
GLOBULIN SER CALC-MCNC: 4.7 G/DL (ref 2–4)
GLUCOSE SERPL-MCNC: 121 MG/DL (ref 65–100)
GLUCOSE UR STRIP.AUTO-MCNC: NEGATIVE MG/DL
HCT VFR BLD AUTO: 42.1 % (ref 35–47)
HGB BLD-MCNC: 13.4 G/DL (ref 11.5–16)
HGB UR QL STRIP: NEGATIVE
HYALINE CASTS URNS QL MICRO: NORMAL /LPF (ref 0–5)
IMM GRANULOCYTES # BLD AUTO: 0 K/UL (ref 0–0.04)
IMM GRANULOCYTES NFR BLD AUTO: 0 % (ref 0–0.5)
KETONES UR QL STRIP.AUTO: NEGATIVE MG/DL
LEUKOCYTE ESTERASE UR QL STRIP.AUTO: NEGATIVE
LYMPHOCYTES # BLD: 1.7 K/UL (ref 0.8–3.5)
LYMPHOCYTES NFR BLD: 20 % (ref 12–49)
MCH RBC QN AUTO: 30.5 PG (ref 26–34)
MCHC RBC AUTO-ENTMCNC: 31.8 G/DL (ref 30–36.5)
MCV RBC AUTO: 95.7 FL (ref 80–99)
MONOCYTES # BLD: 0.5 K/UL (ref 0–1)
MONOCYTES NFR BLD: 6 % (ref 5–13)
NEUTS SEG # BLD: 6 K/UL (ref 1.8–8)
NEUTS SEG NFR BLD: 71 % (ref 32–75)
NITRITE UR QL STRIP.AUTO: NEGATIVE
NRBC # BLD: 0 K/UL (ref 0–0.01)
NRBC BLD-RTO: 0 PER 100 WBC
PH UR STRIP: 7.5 [PH] (ref 5–8)
PLATELET # BLD AUTO: 278 K/UL (ref 150–400)
PMV BLD AUTO: 10 FL (ref 8.9–12.9)
POTASSIUM SERPL-SCNC: 3.9 MMOL/L (ref 3.5–5.1)
PROT SERPL-MCNC: 8.4 G/DL (ref 6.4–8.2)
PROT UR STRIP-MCNC: NEGATIVE MG/DL
RBC # BLD AUTO: 4.4 M/UL (ref 3.8–5.2)
RBC #/AREA URNS HPF: NORMAL /HPF (ref 0–5)
SODIUM SERPL-SCNC: 141 MMOL/L (ref 136–145)
SP GR UR REFRACTOMETRY: 1.01 (ref 1–1.03)
UA: UC IF INDICATED,UAUC: NORMAL
UROBILINOGEN UR QL STRIP.AUTO: 0.2 EU/DL (ref 0.2–1)
WBC # BLD AUTO: 8.5 K/UL (ref 3.6–11)
WBC URNS QL MICRO: NORMAL /HPF (ref 0–4)

## 2019-12-20 PROCEDURE — 36415 COLL VENOUS BLD VENIPUNCTURE: CPT

## 2019-12-20 PROCEDURE — 80053 COMPREHEN METABOLIC PANEL: CPT

## 2019-12-20 PROCEDURE — 81001 URINALYSIS AUTO W/SCOPE: CPT

## 2019-12-20 PROCEDURE — 85025 COMPLETE CBC W/AUTO DIFF WBC: CPT

## 2019-12-20 PROCEDURE — 74011250636 HC RX REV CODE- 250/636: Performed by: EMERGENCY MEDICINE

## 2019-12-20 RX ORDER — ONDANSETRON 4 MG/1
4 TABLET, ORALLY DISINTEGRATING ORAL
Qty: 10 TAB | Refills: 0 | Status: SHIPPED | OUTPATIENT
Start: 2019-12-20

## 2019-12-20 RX ORDER — ONDANSETRON 2 MG/ML
4 INJECTION INTRAMUSCULAR; INTRAVENOUS
Status: COMPLETED | OUTPATIENT
Start: 2019-12-20 | End: 2019-12-20

## 2019-12-20 RX ADMIN — SODIUM CHLORIDE 1000 ML: 900 INJECTION, SOLUTION INTRAVENOUS at 01:08

## 2019-12-20 RX ADMIN — ONDANSETRON 4 MG: 2 INJECTION INTRAMUSCULAR; INTRAVENOUS at 01:08

## 2019-12-20 NOTE — ED PROVIDER NOTES
EMERGENCY DEPARTMENT HISTORY AND PHYSICAL EXAM     ----------------------------------------------------------------------------  Please note that this dictation was completed with [employer] the [employer] voice recognition software. Quite often unanticipated grammatical, syntax, homophones, and other interpretive errors are inadvertently transcribed by the computer software. Please disregard these errors. Please excuse any errors that have escaped final proofreading  ----------------------------------------------------------------------------      Date: 12/19/2019  Patient Name: Sin Ashley    History of Presenting Illness     Chief Complaint   Patient presents with    Vomiting       History Provided By:  Patient    HPI: Sin Ashley is a 75 y.o. female, with significant pmhx of schizophrenia, skin cancer, Parkinson's, who presents via EMS from [facility] to the ED with c/o vomiting earlier today. Patient notes continued mild nausea but no episodes of vomiting while in the emergency department. Denies abdominal pain or diarrhea, fever or chills. With history of urinary incontinence per her chart and notes that she has had malodorous urine for \"quite some time. \"  Patient specifically denies any associated fevers, chills diarrhea, abd pain, CP, SOB, urinary sxs, changes in BM, or headache. Social Hx: denies tobacco  denies EtOH , denies Illicit Drugs    There are no other complaints, changes, or physical findings at this time. PCP: Alondra Delacruz MD    Allergies   Allergen Reactions    Haldol [Haloperidol Lactate] Other (comments)     Did not like how it made  Her feel       Current Outpatient Medications   Medication Sig Dispense Refill    ondansetron (ZOFRAN ODT) 4 mg disintegrating tablet Take 1 Tab by mouth every eight (8) hours as needed for Nausea. 10 Tab 0    loratadine (CLARITIN) 10 mg tablet Take 10 mg by mouth daily as needed for Allergies.  For congestion      OTHER DILCIA stockings - on in the morning & remove at bedtime to improve circulation as needed      HYDROcodone-acetaminophen (NORCO) 5-325 mg per tablet Take 1 Tab by mouth every four (4) hours as needed for Pain. Max Daily Amount: 6 Tabs. 25 Tab 0    mirabegron (MYRBETRIQ PO) Take 25 mg by mouth daily.  cranberry 500 mg capsule Take 500 mg by mouth daily.  perphenazine (TRILAFON) 8 mg tablet Take 16 mg by mouth. Take 2 tablets by mouth each night at bedtime 16 mg      CARBOXYMETHYLCELLULOS/GLYCERIN (OPTIVE OP) Apply  to eye three (3) times daily.  perphenazine (,TRILAFON,TABLET) 4 mg tablet Take 2 Tabs by mouth three (3) times daily. Takes 2 in AM, 2 in PM, and 2 at HS (Patient taking differently: Take 16 mg by mouth nightly. 2 TABLETS) 180 Tab 6    fluoxetine (PROZAC) 20 mg capsule Take 60 mg by mouth daily. Take 3 at HS       amitriptyline (ELAVIL) 75 mg tablet Take  by mouth nightly.  benztropine (COGENTIN) 0.5 mg tablet Take 1 mg by mouth two (2) times a day. Takes 1 mg TID          Past History     Past Medical History:  Past Medical History:   Diagnosis Date    Anemia     Arthritis     Asthma     Cancer ([facility] [address]     skin    Conjunctivitis     Depression     Edema     History of Bell's palsy 3/30/2010    Obesity, unspecified     paranoid schizophrenia     Parkinson's disease     Psychiatric disorder     schizoaffective    urinary incontinence     urinary incontinence    Varicose veins        Past Surgical History:  Past Surgical History:   Procedure Laterality Date    HX DILATION AND CURETTAGE      HX OTHER SURGICAL  12/06/2018    BIOPSY ON RT. THIGH / DR. EVERARDO MOSLEY       Family History:  Family History   Problem Relation Age of Onset    Breast Cancer Maternal Grandmother     Cancer Maternal Grandmother         breast       Social History:  Social History     Tobacco Use    Smoking status: Never Smoker    Smokeless tobacco: Never Used   Substance Use Topics    Alcohol use: No    Drug use: Yes     Types: Prescription       Allergies: Allergies   Allergen Reactions    Haldol [Haloperidol Lactate] Other (comments)     Did not like how it made  Her feel         Review of Systems   Review of Systems   Constitutional: Negative. Negative for fever. Eyes: Negative. Respiratory: Negative. Negative for shortness of breath. Cardiovascular: Negative for chest pain. Gastrointestinal: Positive for nausea and vomiting. Negative for abdominal pain. Endocrine: Negative. Genitourinary: Negative. Negative for difficulty urinating, dysuria and hematuria. Musculoskeletal: Negative. Skin: Negative. Neurological: Negative. Psychiatric/Behavioral: Negative for suicidal ideas. All other systems reviewed and are negative. Physical Exam   Physical Exam  Vitals signs and nursing note reviewed. Constitutional:       General: She is not in acute distress. Appearance: She is well-developed. She is not diaphoretic. HENT:      Head: Normocephalic and atraumatic. Nose: Nose normal.   Eyes:      General: No scleral icterus. Conjunctiva/sclera: Conjunctivae normal.   Neck:      Musculoskeletal: Normal range of motion. Trachea: No tracheal deviation. Cardiovascular:      Rate and Rhythm: Normal rate and regular rhythm. Heart sounds: Normal heart sounds. No murmur. No friction rub. Pulmonary:      Effort: Pulmonary effort is normal. No respiratory distress. Breath sounds: Normal breath sounds. No stridor. No wheezing or rales. Abdominal:      General: Bowel sounds are normal. There is no distension. Palpations: Abdomen is soft. Tenderness: There is no tenderness. There is no rebound. Musculoskeletal: Normal range of motion. General: No tenderness. Skin:     General: Skin is warm and dry. Findings: No rash. Neurological:      Mental Status: She is alert and oriented to person, place, and time. GCS: GCS eye subscore is 4.  GCS verbal subscore is 5. GCS motor subscore is 6. Motor: Tremor (resting) present. Psychiatric:         Speech: Speech normal.         Behavior: Behavior normal.         Thought Content: Thought content normal.         Judgment: Judgment normal.           Diagnostic Study Results     Labs -     Recent Results (from the past 12 hour(s))   METABOLIC PANEL, COMPREHENSIVE    Collection Time: 12/20/19 12:52 AM   Result Value Ref Range    Sodium 141 136 - 145 mmol/L    Potassium 3.9 3.5 - 5.1 mmol/L    Chloride 106 97 - 108 mmol/L    CO2 30 21 - 32 mmol/L    Anion gap 5 5 - 15 mmol/L    Glucose 121 (H) 65 - 100 mg/dL    BUN 27 (H) 6 - 20 MG/DL    Creatinine 1.34 (H) 0.55 - 1.02 MG/DL    BUN/Creatinine ratio 20 12 - 20      GFR est AA 47 (L) >60 ml/min/1.73m2    GFR est non-AA 39 (L) >60 ml/min/1.73m2    Calcium 8.7 8.5 - 10.1 MG/DL    Bilirubin, total 0.3 0.2 - 1.0 MG/DL    ALT (SGPT) 31 12 - 78 U/L    AST (SGOT) 23 15 - 37 U/L    Alk. phosphatase 129 (H) 45 - 117 U/L    Protein, total 8.4 (H) 6.4 - 8.2 g/dL    Albumin 3.7 3.5 - 5.0 g/dL    Globulin 4.7 (H) 2.0 - 4.0 g/dL    A-G Ratio 0.8 (L) 1.1 - 2.2     CBC WITH AUTOMATED DIFF    Collection Time: 12/20/19 12:52 AM   Result Value Ref Range    WBC 8.5 3.6 - 11.0 K/uL    RBC 4.40 3.80 - 5.20 M/uL    HGB 13.4 11.5 - 16.0 g/dL    HCT 42.1 35.0 - 47.0 %    MCV 95.7 80.0 - 99.0 FL    MCH 30.5 26.0 - 34.0 PG    MCHC 31.8 30.0 - 36.5 g/dL    RDW 12.3 11.5 - 14.5 %    PLATELET 747 121 - 380 K/uL    MPV 10.0 8.9 - 12.9 FL    NRBC 0.0 0  WBC    ABSOLUTE NRBC 0.00 0.00 - 0.01 K/uL    NEUTROPHILS 71 32 - 75 %    LYMPHOCYTES 20 12 - 49 %    MONOCYTES 6 5 - 13 %    EOSINOPHILS 2 0 - 7 %    BASOPHILS 1 0 - 1 %    IMMATURE GRANULOCYTES 0 0.0 - 0.5 %    ABS. NEUTROPHILS 6.0 1.8 - 8.0 K/UL    ABS. LYMPHOCYTES 1.7 0.8 - 3.5 K/UL    ABS. MONOCYTES 0.5 0.0 - 1.0 K/UL    ABS. EOSINOPHILS 0.2 0.0 - 0.4 K/UL    ABS. BASOPHILS 0.1 0.0 - 0.1 K/UL    ABS. IMM.  GRANS. 0.0 0.00 - 0.04 K/UL    DF AUTOMATED     URINALYSIS W/ REFLEX CULTURE    Collection Time: 12/20/19  1:29 AM   Result Value Ref Range    Color YELLOW/STRAW      Appearance CLEAR CLEAR      Specific gravity 1.014 1.003 - 1.030      pH (UA) 7.5 5.0 - 8.0      Protein NEGATIVE  NEG mg/dL    Glucose NEGATIVE  NEG mg/dL    Ketone NEGATIVE  NEG mg/dL    Bilirubin NEGATIVE  NEG      Blood NEGATIVE  NEG      Urobilinogen 0.2 0.2 - 1.0 EU/dL    Nitrites NEGATIVE  NEG      Leukocyte Esterase NEGATIVE  NEG      WBC 0-4 0 - 4 /hpf    RBC 0-5 0 - 5 /hpf    Epithelial cells FEW FEW /lpf    Bacteria NEGATIVE  NEG /hpf    UA:UC IF INDICATED CULTURE NOT INDICATED BY UA RESULT CNI      Hyaline cast 0-2 0 - 5 /lpf       Radiologic Studies -   No orders to display     CT Results  (Last 48 hours)    None        CXR Results  (Last 48 hours)    None            Medical Decision Making   I am the first provider for this patient. I reviewed the vital signs, available nursing notes, past medical history, past surgical history, family history and social history. Vital Signs-Reviewed the patient's vital signs. Patient Vitals for the past 12 hrs:   Temp Pulse Resp BP SpO2   12/19/19 2300 97.8 °F (36.6 °C) 68 18 176/90 98 %       Pulse Oximetry Analysis - 98% on RA    Cardiac Monitor:   Rate: 68 bpm  Rhythm: nsr    Records Reviewed: Nursing Notes and Old Medical Records    Provider Notes (Medical Decision Making):     DDX:  Uti, viral gastroenteritis,     Plan:  Labs, ua, antiemeitc    Impression:  n/v    ED Course:   Initial assessment performed. The patients presenting problems have been discussed, and they are in agreement with the care plan formulated and outlined with them. I have encouraged them to ask questions as they arise throughout their visit.     I reviewed our electronic medical record system for any past medical records that were available that may contribute to the patients current condition, the nursing notes and and vital signs from today's visit    Nursing notes will be reviewed as they become available in realtime while the pt has been in the ED. Milan Bishop MD    HYPERTENSION COUNSELING:  Patient made aware of their elevated blood pressure and is instructed to follow up this week with their Primary Care or [address] [id number] for a recheck (should they be discharged.) Patient is counseled regarding consequences of chronic, uncontrolled hypertension including kidney disease, heart disease, stroke or even death. Patient states their understanding    I personally reviewed pt's imaging. Official read by radiology noted above. Milan Bishop MD      2:19 AM  Progress note:  Pt noted to be feeling better, able to tolerate po, ready for discharge. Discussed lab findings with pt and/or family, specifically noting no evidence of UTI. Pt will follow up with PCP as instructed. All questions have been answered, pt voiced understanding and agreement with plan. Specific return precautions provided in addition to instructions for pt to return to the ED immediately should sx worsen at any time. Milan Bishop MD           Critical Care Time:     none      Diagnosis     Clinical Impression:   1. Non-intractable vomiting with nausea, unspecified vomiting type        PLAN:  1. Current Discharge Medication List      START taking these medications    Details   ondansetron (ZOFRAN ODT) 4 mg disintegrating tablet Take 1 Tab by mouth every eight (8) hours as needed for Nausea. Qty: 10 Tab, Refills: 0           2. Follow-up Information     Follow up With Specialties Details Why Contact Info    Paul Ojeda MD Family Practice Schedule an appointment as soon as possible for a visit in 2 days  [facility] [phone]          Return to ED if worse     Disposition:  2:19 AM  The patient's results have been reviewed with family and/or caregiver.  They verbally convey their understanding and agreement of the patient's signs, symptoms, diagnosis, treatment and prognosis and additionally agree to follow up as recommended in the discharge instructions or to return to the Emergency Room should the patient's condition change prior to their follow-up appointment. The family and/or caregiver verbally agrees with the care-plan and all of their questions have been answered. The discharge instructions have also been provided to the them with educational information regarding the patient's diagnosis as well a list of reasons why the patient would want to return to the ER prior to their follow-up appointment should their condition change. Rosa Bowman MD            This note will not be viewable in [address]

## 2019-12-20 NOTE — ED TRIAGE NOTES
Patient arrived via EMS c/o N/Vx6 since dinner. Patient denies N/V, abdominal pain at this time. Patient here from [facility].

## 2019-12-20 NOTE — DISCHARGE INSTRUCTIONS
Patient Education        Nausea and Vomiting: Care Instructions  Your Care Instructions    When you are nauseated, you may feel weak and sweaty and notice a lot of saliva in your mouth. Nausea often leads to vomiting. Most of the time you do not need to worry about nausea and vomiting, but they can be signs of other illnesses. Two common causes of nausea and vomiting are stomach flu and food poisoning. Nausea and vomiting from viral stomach flu will usually start to improve within 24 hours. Nausea and vomiting from food poisoning may last from 12 to 48 hours. The doctor has checked you carefully, but problems can develop later. If you notice any problems or new symptoms, get medical treatment right away. Follow-up care is a key part of your treatment and safety. Be sure to make and go to all appointments, and call your doctor if you are having problems. It's also a good idea to know your test results and keep a list of the medicines you take. How can you care for yourself at home? · To prevent dehydration, drink plenty of fluids, enough so that your urine is light yellow or clear like water. Choose water and other caffeine-free clear liquids until you feel better. If you have kidney, heart, or liver disease and have to limit fluids, talk with your doctor before you increase the amount of fluids you drink. · Rest in bed until you feel better. · When you are able to eat, try clear soups, mild foods, and liquids until all symptoms are gone for 12 to 48 hours. Other good choices include dry toast, crackers, cooked cereal, and gelatin dessert, such as Jell-O. When should you call for help? Call 911 anytime you think you may need emergency care. For example, call if:    · You passed out (lost consciousness).    Call your doctor now or seek immediate medical care if:    · You have symptoms of dehydration, such as:  ? Dry eyes and a dry mouth. ? Passing only a little dark urine. ?  Feeling thirstier than usual.   · You have new or worsening belly pain.     · You have a new or higher fever.     · You vomit blood or what looks like coffee grounds.    Watch closely for changes in your health, and be sure to contact your doctor if:    · You have ongoing nausea and vomiting.     · Your vomiting is getting worse.     · Your vomiting lasts longer than 2 days.     · You are not getting better as expected. Where can you learn more? Go to http://anahi-phoebe.info/. Enter 25 [id number] in the search box to learn more about \"Nausea and Vomiting: Care Instructions. \"  Current as of: June 26, 2019  Content Version: 12.2  © 0194-2294 [employer], [employer]. Care instructions adapted under license by [employer] (which disclaims liability or warranty for this information). If you have questions about a medical condition or this instruction, always ask your healthcare professional. [address] any warranty or liability for your use of this information.

## 2020-08-25 ENCOUNTER — APPOINTMENT (OUTPATIENT)
Dept: GENERAL RADIOLOGY | Age: 75
End: 2020-08-25
Attending: EMERGENCY MEDICINE
Payer: MEDICARE

## 2020-08-25 ENCOUNTER — HOSPITAL ENCOUNTER (EMERGENCY)
Age: 75
Discharge: NURSING, REHAB OR LONG-TERM CARE | End: 2020-08-25
Attending: EMERGENCY MEDICINE
Payer: MEDICARE

## 2020-08-25 VITALS
RESPIRATION RATE: 19 BRPM | OXYGEN SATURATION: 96 % | BODY MASS INDEX: 36.55 KG/M2 | HEIGHT: 63 IN | HEART RATE: 77 BPM | DIASTOLIC BLOOD PRESSURE: 89 MMHG | TEMPERATURE: 98.8 F | SYSTOLIC BLOOD PRESSURE: 146 MMHG

## 2020-08-25 DIAGNOSIS — R06.02 SOB (SHORTNESS OF BREATH): Primary | ICD-10-CM

## 2020-08-25 LAB
ALBUMIN SERPL-MCNC: 3.2 G/DL (ref 3.5–5)
ALBUMIN/GLOB SERPL: 0.8 {RATIO} (ref 1.1–2.2)
ALP SERPL-CCNC: 163 U/L (ref 45–117)
ALT SERPL-CCNC: 22 U/L (ref 12–78)
ANION GAP SERPL CALC-SCNC: 5 MMOL/L (ref 5–15)
AST SERPL-CCNC: 20 U/L (ref 15–37)
BASOPHILS # BLD: 0 K/UL (ref 0–0.1)
BASOPHILS NFR BLD: 1 % (ref 0–1)
BILIRUB SERPL-MCNC: 0.2 MG/DL (ref 0.2–1)
BNP SERPL-MCNC: 127 PG/ML
BUN SERPL-MCNC: 28 MG/DL (ref 6–20)
BUN/CREAT SERPL: 21 (ref 12–20)
CALCIUM SERPL-MCNC: 8.8 MG/DL (ref 8.5–10.1)
CHLORIDE SERPL-SCNC: 106 MMOL/L (ref 97–108)
CK MB CFR SERPL CALC: 2 % (ref 0–2.5)
CK MB SERPL-MCNC: 2.4 NG/ML (ref 5–25)
CK SERPL-CCNC: 123 U/L (ref 26–192)
CO2 SERPL-SCNC: 29 MMOL/L (ref 21–32)
CREAT SERPL-MCNC: 1.32 MG/DL (ref 0.55–1.02)
DIFFERENTIAL METHOD BLD: NORMAL
EOSINOPHIL # BLD: 0.3 K/UL (ref 0–0.4)
EOSINOPHIL NFR BLD: 5 % (ref 0–7)
ERYTHROCYTE [DISTWIDTH] IN BLOOD BY AUTOMATED COUNT: 12.5 % (ref 11.5–14.5)
GLOBULIN SER CALC-MCNC: 3.9 G/DL (ref 2–4)
GLUCOSE SERPL-MCNC: 131 MG/DL (ref 65–100)
HCT VFR BLD AUTO: 39.8 % (ref 35–47)
HGB BLD-MCNC: 12.5 G/DL (ref 11.5–16)
IMM GRANULOCYTES # BLD AUTO: 0 K/UL (ref 0–0.04)
IMM GRANULOCYTES NFR BLD AUTO: 0 % (ref 0–0.5)
LYMPHOCYTES # BLD: 1.7 K/UL (ref 0.8–3.5)
LYMPHOCYTES NFR BLD: 25 % (ref 12–49)
MCH RBC QN AUTO: 30.1 PG (ref 26–34)
MCHC RBC AUTO-ENTMCNC: 31.4 G/DL (ref 30–36.5)
MCV RBC AUTO: 95.9 FL (ref 80–99)
MONOCYTES # BLD: 0.7 K/UL (ref 0–1)
MONOCYTES NFR BLD: 10 % (ref 5–13)
NEUTS SEG # BLD: 4.1 K/UL (ref 1.8–8)
NEUTS SEG NFR BLD: 59 % (ref 32–75)
NRBC # BLD: 0 K/UL (ref 0–0.01)
NRBC BLD-RTO: 0 PER 100 WBC
PLATELET # BLD AUTO: 246 K/UL (ref 150–400)
PMV BLD AUTO: 10.6 FL (ref 8.9–12.9)
POTASSIUM SERPL-SCNC: 3.7 MMOL/L (ref 3.5–5.1)
PROT SERPL-MCNC: 7.1 G/DL (ref 6.4–8.2)
RBC # BLD AUTO: 4.15 M/UL (ref 3.8–5.2)
SODIUM SERPL-SCNC: 140 MMOL/L (ref 136–145)
TROPONIN I SERPL-MCNC: <0.05 NG/ML
WBC # BLD AUTO: 6.9 K/UL (ref 3.6–11)

## 2020-08-25 PROCEDURE — 84484 ASSAY OF TROPONIN QUANT: CPT

## 2020-08-25 PROCEDURE — 80053 COMPREHEN METABOLIC PANEL: CPT

## 2020-08-25 PROCEDURE — 83880 ASSAY OF NATRIURETIC PEPTIDE: CPT

## 2020-08-25 PROCEDURE — 99285 EMERGENCY DEPT VISIT HI MDM: CPT

## 2020-08-25 PROCEDURE — 82550 ASSAY OF CK (CPK): CPT

## 2020-08-25 PROCEDURE — 71045 X-RAY EXAM CHEST 1 VIEW: CPT

## 2020-08-25 PROCEDURE — 85025 COMPLETE CBC W/AUTO DIFF WBC: CPT

## 2020-08-25 PROCEDURE — 36415 COLL VENOUS BLD VENIPUNCTURE: CPT

## 2020-08-25 RX ORDER — SODIUM CHLORIDE 0.9 % (FLUSH) 0.9 %
5-40 SYRINGE (ML) INJECTION AS NEEDED
Status: DISCONTINUED | OUTPATIENT
Start: 2020-08-25 | End: 2020-08-25 | Stop reason: HOSPADM

## 2020-08-25 RX ORDER — SODIUM CHLORIDE 0.9 % (FLUSH) 0.9 %
5-40 SYRINGE (ML) INJECTION EVERY 8 HOURS
Status: DISCONTINUED | OUTPATIENT
Start: 2020-08-25 | End: 2020-08-25 | Stop reason: HOSPADM

## 2020-08-25 RX ADMIN — Medication 10 ML: at 01:50

## 2020-08-25 NOTE — ED PROVIDER NOTES
EMERGENCY DEPARTMENT HISTORY AND PHYSICAL EXAM      Date: 8/25/2020  Patient Name: Melissa Rivera    History of Presenting Illness     Chief Complaint   Patient presents with    Shortness of Breath       History Provided By: Patient and EMS    HPI: Melissa Rivera, 75 y.o. female with PMHx significant for anemia, arthritis, asthma, schizophrenia, Parkinson's disease presents to the ED by EMS from [facility]. Patient woke up in the middle of the night and complained of shortness of breath. She states she is not short of breath at this time. Patient is a difficult historian and seems to be unwilling to answer some questions. She denies any chest pain, nausea, vomiting. PCP: Anamaria Garcia MD    No current facility-administered medications on file prior to encounter. Current Outpatient Medications on File Prior to Encounter   Medication Sig Dispense Refill    ondansetron (ZOFRAN ODT) 4 mg disintegrating tablet Take 1 Tab by mouth every eight (8) hours as needed for Nausea. 10 Tab 0    loratadine (CLARITIN) 10 mg tablet Take 10 mg by mouth daily as needed for Allergies. For congestion      OTHER DILCIA stockings - on in the morning & remove at bedtime to improve circulation as needed      HYDROcodone-acetaminophen (NORCO) 5-325 mg per tablet Take 1 Tab by mouth every four (4) hours as needed for Pain. Max Daily Amount: 6 Tabs. 25 Tab 0    mirabegron (MYRBETRIQ PO) Take 25 mg by mouth daily.  cranberry 500 mg capsule Take 500 mg by mouth daily.  perphenazine (TRILAFON) 8 mg tablet Take 16 mg by mouth. Take 2 tablets by mouth each night at bedtime 16 mg      CARBOXYMETHYLCELLULOS/GLYCERIN (OPTIVE OP) Apply  to eye three (3) times daily.  perphenazine (,TRILAFON,TABLET) 4 mg tablet Take 2 Tabs by mouth three (3) times daily. Takes 2 in AM, 2 in PM, and 2 at HS (Patient taking differently: Take 16 mg by mouth nightly.  2 TABLETS) 180 Tab 6    fluoxetine (PROZAC) 20 mg capsule Take 60 mg by mouth daily. Take 3 at HS       amitriptyline (ELAVIL) 75 mg tablet Take  by mouth nightly.  benztropine (COGENTIN) 0.5 mg tablet Take 1 mg by mouth two (2) times a day. Takes 1 mg TID          Past History     Past Medical History:  Past Medical History:   Diagnosis Date    Anemia     Arthritis     Asthma     Cancer ([address]     skin    Conjunctivitis     Depression     Edema     History of Bell's palsy 3/30/2010    Obesity, unspecified     paranoid schizophrenia     Parkinson's disease     Psychiatric disorder     schizoaffective    urinary incontinence     urinary incontinence    Varicose veins        Past Surgical History:  Past Surgical History:   Procedure Laterality Date    HX DILATION AND CURETTAGE      HX OTHER SURGICAL  12/06/2018    BIOPSY ON RT. THIGH / DR. EVERARDO MOSLEY       Family History:  Family History   Problem Relation Age of Onset    Breast Cancer Maternal Grandmother     Cancer Maternal Grandmother         breast       Social History:  Social History     Tobacco Use    Smoking status: Never Smoker    Smokeless tobacco: Never Used   Substance Use Topics    Alcohol use: No    Drug use: Yes     Types: Prescription       Allergies: Allergies   Allergen Reactions    Haldol [Haloperidol Lactate] Other (comments)     Did not like how it made  Her feel         Review of Systems   Review of Systems   Unable to perform ROS: Dementia     Refuses to answer questions and seems to be unclear on why she is here.     Physical Exam   General appearance -elderly, well appearing, and in no distress  Eyes - pupils equal and reactive, extraocular eye movements intact  ENT - mucous membranes moist, pharynx normal without lesions  Neck - supple, no significant adenopathy; non-tender to palpation  Chest - clear to auscultation, no wheezes, rales or rhonchi; non-tender to palpation, normal respiratory effort, talking in complete sentences, normal respiratory rate  Heart - normal rate and regular rhythm, S1 and S2 normal, no murmurs noted  Abdomen - soft, nontender, nondistended, no masses or organomegaly  Musculoskeletal - no joint tenderness, deformity or swelling; normal ROM  Extremities - peripheral pulses normal, trace bilateral lower extremity edema  Skin - normal coloration and turgor, no rashes  Neurological - alert, patient refuses to answer orientation questions for me, but nurse reportedly was able to get her to answer them and she was oriented x3, normal speech, no focal findings  noted, resting tremor present especially in right hand    Diagnostic Study Results     Labs -     Recent Results (from the past 12 hour(s))   CBC WITH AUTOMATED DIFF    Collection Time: 08/25/20  2:00 AM   Result Value Ref Range    WBC 6.9 3.6 - 11.0 K/uL    RBC 4.15 3.80 - 5.20 M/uL    HGB 12.5 11.5 - 16.0 g/dL    HCT 39.8 35.0 - 47.0 %    MCV 95.9 80.0 - 99.0 FL    MCH 30.1 26.0 - 34.0 PG    MCHC 31.4 30.0 - 36.5 g/dL    RDW 12.5 11.5 - 14.5 %    PLATELET 575 091 - 714 K/uL    MPV 10.6 8.9 - 12.9 FL    NRBC 0.0 0  WBC    ABSOLUTE NRBC 0.00 0.00 - 0.01 K/uL    NEUTROPHILS 59 32 - 75 %    LYMPHOCYTES 25 12 - 49 %    MONOCYTES 10 5 - 13 %    EOSINOPHILS 5 0 - 7 %    BASOPHILS 1 0 - 1 %    IMMATURE GRANULOCYTES 0 0.0 - 0.5 %    ABS. NEUTROPHILS 4.1 1.8 - 8.0 K/UL    ABS. LYMPHOCYTES 1.7 0.8 - 3.5 K/UL    ABS. MONOCYTES 0.7 0.0 - 1.0 K/UL    ABS. EOSINOPHILS 0.3 0.0 - 0.4 K/UL    ABS. BASOPHILS 0.0 0.0 - 0.1 K/UL    ABS. IMM.  GRANS. 0.0 0.00 - 0.04 K/UL    DF AUTOMATED     METABOLIC PANEL, COMPREHENSIVE    Collection Time: 08/25/20  2:00 AM   Result Value Ref Range    Sodium 140 136 - 145 mmol/L    Potassium 3.7 3.5 - 5.1 mmol/L    Chloride 106 97 - 108 mmol/L    CO2 29 21 - 32 mmol/L    Anion gap 5 5 - 15 mmol/L    Glucose 131 (H) 65 - 100 mg/dL    BUN 28 (H) 6 - 20 MG/DL    Creatinine 1.32 (H) 0.55 - 1.02 MG/DL    BUN/Creatinine ratio 21 (H) 12 - 20      GFR est AA 48 (L) >60 ml/min/1.73m2    GFR est non-AA 39 (L) >60 ml/min/1.73m2    Calcium 8.8 8.5 - 10.1 MG/DL    Bilirubin, total 0.2 0.2 - 1.0 MG/DL    ALT (SGPT) 22 12 - 78 U/L    AST (SGOT) 20 15 - 37 U/L    Alk. phosphatase 163 (H) 45 - 117 U/L    Protein, total 7.1 6.4 - 8.2 g/dL    Albumin 3.2 (L) 3.5 - 5.0 g/dL    Globulin 3.9 2.0 - 4.0 g/dL    A-G Ratio 0.8 (L) 1.1 - 2.2     CK W/ CKMB & INDEX    Collection Time: 08/25/20  2:00 AM   Result Value Ref Range    CK - MB 2.4 <3.6 NG/ML    CK-MB Index 2.0 0.0 - 2.5       26 - 192 U/L   TROPONIN I    Collection Time: 08/25/20  2:00 AM   Result Value Ref Range    Troponin-I, Qt. <0.05 <0.05 ng/mL   NT-PRO BNP    Collection Time: 08/25/20  2:00 AM   Result Value Ref Range    NT pro- <450 PG/ML       Radiologic Studies -   XR CHEST PORT   Final Result   IMPRESSION: No acute cardiopulmonary process. Linear atelectasis/scarring in the   right midlung. Unchanged cardiomegaly. CT Results  (Last 48 hours)    None        CXR Results  (Last 48 hours)               08/25/20 0151  XR CHEST PORT Final result    Impression:  IMPRESSION: No acute cardiopulmonary process. Linear atelectasis/scarring in the   right midlung. Unchanged cardiomegaly. Narrative:  EXAM:  XR CHEST PORT       INDICATION:   sob       COMPARISON: Chest radiograph 12/19/2013. FINDINGS: AP radiograph of the chest was obtained. No evidence of focal consolidation. Linear atelectasis/scarring in the right   midlung. No pleural effusion or pneumothorax. Unchanged cardiomegaly. No acute   osseous abnormalities. Medical Decision Making   I am the first provider for this patient. I reviewed the vital signs, available nursing notes, past medical history, past surgical history, family history and social history. Vital Signs-Reviewed the patient's vital signs.   Patient Vitals for the past 12 hrs:   Temp Pulse Resp BP SpO2   08/25/20 0418 -- -- -- -- 94 %   08/25/20 0227 -- 77 13 (!) 151/93 90 % 08/25/20 0119 98 °F (36.7 °C) 77 19 -- 93 %         Records Reviewed: Nursing Notes and Old Medical Records    Provider Notes (Medical Decision Making):   Differential diagnosis: Pneumonia, bronchitis, congestive heart failure, COPD, anxiety  We will check CBC, CMP, cardiac enzymes, proBNP    ED Course:   Initial assessment performed. The patients presenting problems have been discussed, and they are in agreement with the care plan formulated and outlined with them. I have encouraged them to ask questions as they arise throughout their visit. Progress Notes:  ED Course as of Aug 26 2014   Tue Aug 25, 2020   0424 Patient is feeling better. She is no longer feeling short of breath. She ambulated in the ED without dyspnea or hypoxia. [AO]      ED Course User Index  [AO] Olinda Benavidez MD       Disposition:  Discharge home    PLAN:  1. Discharge Medication List as of 8/25/2020  8:15 AM        2. Follow-up Information     Follow up With Specialties Details Why Contact Info    [facility] [place] EM[facility] Emergency Medicine Schedule an appointment as soon as possible for a visit  [address] [facility] [address]    Estefanía Childers MD Family Medicine Call today  [phone] [facility]  [address] [address] [phone]          Return to ED if worse     Diagnosis     Clinical Impression:   1.  SOB (shortness of breath)

## 2020-08-25 NOTE — ED NOTES
Report given to FRANCES Yarbrough. All questions welcomed and answered. Relinquished care of pt at this time.

## 2020-08-25 NOTE — DISCHARGE INSTRUCTIONS

## 2020-08-25 NOTE — ED NOTES
Shanon RN reviewed discharge instructions with the patient. The patient verbalized understanding. All questions and concerns were addressed. The patient is discharged via [facility] in the care of EMT personnel. Pt is alert and oriented x 4. Respirations are clear and unlabored.     Pt transported from [facility] ED to [facility] via [facility]

## 2020-08-25 NOTE — ED TRIAGE NOTES
Pt arrives via EMS c/o SOB. Coming from [facility]. Per EMS, pt went to sleep a couple hours ago and woke up with difficulty breathing. Hx of dementia and schizophrenia.

## 2020-11-04 ENCOUNTER — APPOINTMENT (OUTPATIENT)
Dept: GENERAL RADIOLOGY | Age: 75
End: 2020-11-04
Attending: PHYSICIAN ASSISTANT
Payer: MEDICARE

## 2020-11-04 ENCOUNTER — HOSPITAL ENCOUNTER (EMERGENCY)
Age: 75
Discharge: HOME | End: 2020-11-04
Attending: EMERGENCY MEDICINE
Payer: MEDICARE

## 2020-11-04 VITALS
BODY MASS INDEX: 36.5 KG/M2 | WEIGHT: 206 LBS | DIASTOLIC BLOOD PRESSURE: 89 MMHG | TEMPERATURE: 98.5 F | SYSTOLIC BLOOD PRESSURE: 162 MMHG | HEART RATE: 78 BPM | OXYGEN SATURATION: 95 % | HEIGHT: 63 IN | RESPIRATION RATE: 20 BRPM

## 2020-11-04 DIAGNOSIS — Z20.822 PERSON UNDER INVESTIGATION FOR COVID-19: Primary | ICD-10-CM

## 2020-11-04 DIAGNOSIS — J18.9 COMMUNITY ACQUIRED PNEUMONIA, UNSPECIFIED LATERALITY: ICD-10-CM

## 2020-11-04 DIAGNOSIS — E86.0 DEHYDRATION: ICD-10-CM

## 2020-11-04 LAB
ALBUMIN SERPL-MCNC: 3.6 G/DL (ref 3.5–5)
ALBUMIN/GLOB SERPL: 0.8 {RATIO} (ref 1.1–2.2)
ALP SERPL-CCNC: 132 U/L (ref 45–117)
ALT SERPL-CCNC: 47 U/L (ref 12–78)
ANION GAP SERPL CALC-SCNC: 4 MMOL/L (ref 5–15)
AST SERPL-CCNC: 34 U/L (ref 15–37)
BASOPHILS # BLD: 0.1 K/UL (ref 0–0.1)
BASOPHILS NFR BLD: 1 % (ref 0–1)
BILIRUB SERPL-MCNC: 0.3 MG/DL (ref 0.2–1)
BUN SERPL-MCNC: 23 MG/DL (ref 6–20)
BUN/CREAT SERPL: 15 (ref 12–20)
CALCIUM SERPL-MCNC: 9 MG/DL (ref 8.5–10.1)
CHLORIDE SERPL-SCNC: 104 MMOL/L (ref 97–108)
CO2 SERPL-SCNC: 27 MMOL/L (ref 21–32)
CREAT SERPL-MCNC: 1.57 MG/DL (ref 0.55–1.02)
DIFFERENTIAL METHOD BLD: ABNORMAL
EOSINOPHIL # BLD: 0.1 K/UL (ref 0–0.4)
EOSINOPHIL NFR BLD: 1 % (ref 0–7)
ERYTHROCYTE [DISTWIDTH] IN BLOOD BY AUTOMATED COUNT: 12.6 % (ref 11.5–14.5)
GLOBULIN SER CALC-MCNC: 4.6 G/DL (ref 2–4)
GLUCOSE SERPL-MCNC: 118 MG/DL (ref 65–100)
HCT VFR BLD AUTO: 41.1 % (ref 35–47)
HGB BLD-MCNC: 13.1 G/DL (ref 11.5–16)
IMM GRANULOCYTES # BLD AUTO: 0 K/UL (ref 0–0.04)
IMM GRANULOCYTES NFR BLD AUTO: 0 % (ref 0–0.5)
LACTATE BLD-SCNC: 1.16 MMOL/L (ref 0.4–2)
LYMPHOCYTES # BLD: 0.6 K/UL (ref 0.8–3.5)
LYMPHOCYTES NFR BLD: 10 % (ref 12–49)
MCH RBC QN AUTO: 30.1 PG (ref 26–34)
MCHC RBC AUTO-ENTMCNC: 31.9 G/DL (ref 30–36.5)
MCV RBC AUTO: 94.5 FL (ref 80–99)
MONOCYTES # BLD: 1.1 K/UL (ref 0–1)
MONOCYTES NFR BLD: 19 % (ref 5–13)
NEUTS SEG # BLD: 4.1 K/UL (ref 1.8–8)
NEUTS SEG NFR BLD: 69 % (ref 32–75)
NRBC # BLD: 0 K/UL (ref 0–0.01)
NRBC BLD-RTO: 0 PER 100 WBC
PLATELET # BLD AUTO: 258 K/UL (ref 150–400)
PMV BLD AUTO: 10.3 FL (ref 8.9–12.9)
POTASSIUM SERPL-SCNC: 4.1 MMOL/L (ref 3.5–5.1)
PROT SERPL-MCNC: 8.2 G/DL (ref 6.4–8.2)
RBC # BLD AUTO: 4.35 M/UL (ref 3.8–5.2)
RBC MORPH BLD: ABNORMAL
SODIUM SERPL-SCNC: 135 MMOL/L (ref 136–145)
WBC # BLD AUTO: 6 K/UL (ref 3.6–11)

## 2020-11-04 PROCEDURE — 99285 EMERGENCY DEPT VISIT HI MDM: CPT

## 2020-11-04 PROCEDURE — 85025 COMPLETE CBC W/AUTO DIFF WBC: CPT

## 2020-11-04 PROCEDURE — 80053 COMPREHEN METABOLIC PANEL: CPT

## 2020-11-04 PROCEDURE — 96360 HYDRATION IV INFUSION INIT: CPT

## 2020-11-04 PROCEDURE — 74011250637 HC RX REV CODE- 250/637: Performed by: PHYSICIAN ASSISTANT

## 2020-11-04 PROCEDURE — 36415 COLL VENOUS BLD VENIPUNCTURE: CPT

## 2020-11-04 PROCEDURE — 71045 X-RAY EXAM CHEST 1 VIEW: CPT

## 2020-11-04 PROCEDURE — 74011250636 HC RX REV CODE- 250/636: Performed by: PHYSICIAN ASSISTANT

## 2020-11-04 PROCEDURE — 87635 SARS-COV-2 COVID-19 AMP PRB: CPT

## 2020-11-04 PROCEDURE — 83605 ASSAY OF LACTIC ACID: CPT

## 2020-11-04 RX ORDER — ACETAMINOPHEN 325 MG/1
650 TABLET ORAL
Status: COMPLETED | OUTPATIENT
Start: 2020-11-04 | End: 2020-11-04

## 2020-11-04 RX ORDER — AZITHROMYCIN 250 MG/1
250 TABLET, FILM COATED ORAL DAILY
Qty: 4 TAB | Refills: 0 | Status: SHIPPED | OUTPATIENT
Start: 2020-11-04 | End: 2020-11-08

## 2020-11-04 RX ORDER — AZITHROMYCIN 250 MG/1
500 TABLET, FILM COATED ORAL
Status: COMPLETED | OUTPATIENT
Start: 2020-11-04 | End: 2020-11-04

## 2020-11-04 RX ORDER — ASCORBIC ACID 500 MG
500 TABLET ORAL 2 TIMES DAILY
Qty: 20 TAB | Refills: 0 | Status: SHIPPED | OUTPATIENT
Start: 2020-11-04 | End: 2020-11-14

## 2020-11-04 RX ADMIN — ACETAMINOPHEN 650 MG: 325 TABLET ORAL at 18:01

## 2020-11-04 RX ADMIN — SODIUM CHLORIDE 1000 ML: 900 INJECTION, SOLUTION INTRAVENOUS at 19:39

## 2020-11-04 RX ADMIN — AZITHROMYCIN 500 MG: 250 TABLET, FILM COATED ORAL at 19:39

## 2020-11-04 NOTE — ED PROVIDER NOTES
EMERGENCY DEPARTMENT HISTORY AND PHYSICAL EXAM      Date: 11/4/2020  Patient Name: Clearence Lanes    History of Presenting Illness     Chief Complaint   Patient presents with    Fever     pt with known covid sent by [facility] for cough and fever. pt reports nausea as well    Cough    Nausea       History Provided By: Patient    HPI: Clearence Lanes, 75 y.o. female with significant PMHx for Parkinsons, schizoaffective disorder, and asthma, presents by EMS to the ED with cc of a cough and fever. The patient reports from the nursing home [facility]. It was reported that the patient is known to be Covid positive. The patient complains of a cough but denies all other complaints. She denies a fever, chills, loss of sensation of smell, loss of sensation of taste, shortness of breath, headache, lightheadedness, and dizziness. She does report some mild nausea. The chronicity of the patient's illness is difficult to determine secondary to her lack of knowledge of when she started feeling poorly. There are no other complaints, changes, or physical findings at this time. Of note: On discharge [facility] informed the nursing staff that the patient tested negative for covid yesterday. It was erroneously reported to us by EMS that they tested positive. The facility is requesting that they be retested prior to discharge. Social Hx: Tobacco (denies), EtOH (denies), Illicit drug use (denies)     PCP: Rafaela Rico MD    No current facility-administered medications on file prior to encounter. Current Outpatient Medications on File Prior to Encounter   Medication Sig Dispense Refill    ondansetron (ZOFRAN ODT) 4 mg disintegrating tablet Take 1 Tab by mouth every eight (8) hours as needed for Nausea. 10 Tab 0    loratadine (CLARITIN) 10 mg tablet Take 10 mg by mouth daily as needed for Allergies.  For congestion      OTHER DILCIA stockings - on in the morning & remove at bedtime to improve circulation as needed      HYDROcodone-acetaminophen (NORCO) 5-325 mg per tablet Take 1 Tab by mouth every four (4) hours as needed for Pain. Max Daily Amount: 6 Tabs. 25 Tab 0    mirabegron (MYRBETRIQ PO) Take 25 mg by mouth daily.  cranberry 500 mg capsule Take 500 mg by mouth daily.  perphenazine (TRILAFON) 8 mg tablet Take 16 mg by mouth. Take 2 tablets by mouth each night at bedtime 16 mg      CARBOXYMETHYLCELLULOS/GLYCERIN (OPTIVE OP) Apply  to eye three (3) times daily.  perphenazine (,TRILAFON,TABLET) 4 mg tablet Take 2 Tabs by mouth three (3) times daily. Takes 2 in AM, 2 in PM, and 2 at HS (Patient taking differently: Take 16 mg by mouth nightly. 2 TABLETS) 180 Tab 6    fluoxetine (PROZAC) 20 mg capsule Take 60 mg by mouth daily. Take 3 at HS       amitriptyline (ELAVIL) 75 mg tablet Take  by mouth nightly.  benztropine (COGENTIN) 0.5 mg tablet Take 1 mg by mouth two (2) times a day. Takes 1 mg TID          Past History     Past Medical History:  Past Medical History:   Diagnosis Date    Anemia     Arthritis     Asthma     [address] [address]     skin    Conjunctivitis     Depression     Edema     History of Bell's palsy 3/30/2010    Obesity, unspecified     paranoid schizophrenia     Parkinson's disease     Psychiatric disorder     schizoaffective    urinary incontinence     urinary incontinence    Varicose veins        Past Surgical History:  Past Surgical History:   Procedure Laterality Date    HX DILATION AND CURETTAGE      HX OTHER SURGICAL  12/06/2018    BIOPSY ON RT. THIGH / DR. EVERARDO MOSLEY       Family History:  Family History   Problem Relation Age of Onset    Breast Cancer Maternal Grandmother     Cancer Maternal Grandmother         breast       Social History:  Social History     Tobacco Use    Smoking status: Never Smoker    Smokeless tobacco: Never Used   Substance Use Topics    Alcohol use: No    Drug use: Yes     Types: Prescription Allergies: Allergies   Allergen Reactions    Haldol [Haloperidol Lactate] Other (comments)     Did not like how it made  Her feel         Review of Systems   Review of Systems   Constitutional: Negative for chills, diaphoresis and fever. HENT: Negative for congestion, ear pain, rhinorrhea and sore throat. Respiratory: Positive for cough. Negative for shortness of breath. Cardiovascular: Negative for chest pain. Gastrointestinal: Positive for nausea. Negative for abdominal pain, constipation, diarrhea and vomiting. Genitourinary: Negative for difficulty urinating, dysuria, frequency and hematuria. Musculoskeletal: Negative for arthralgias and myalgias. Neurological: Negative for headaches. All other systems reviewed and are negative. Physical Exam   Physical Exam  Vitals signs and nursing note reviewed. Constitutional:       General: She is not in acute distress. Appearance: She is well-developed. She is not diaphoretic. Comments: 75 y.o.  female    HENT:      Head: Normocephalic and atraumatic. Eyes:      General:         Right eye: No discharge. Left eye: No discharge. Conjunctiva/sclera: Conjunctivae normal.   Neck:      Musculoskeletal: Normal range of motion and neck supple. Cardiovascular:      Rate and Rhythm: Normal rate and regular rhythm. Pulmonary:      Effort: Pulmonary effort is normal.      Comments: Nonproductive cough. Speaking in clear and complete sentences. Skin:     General: Skin is warm and dry. Neurological:      Mental Status: She is alert and oriented to person, place, and time.    Psychiatric:         Behavior: Behavior normal.         Diagnostic Study Results     Labs -     Recent Results (from the past 12 hour(s))   CBC WITH AUTOMATED DIFF    Collection Time: 11/04/20  6:12 PM   Result Value Ref Range    WBC 6.0 3.6 - 11.0 K/uL    RBC 4.35 3.80 - 5.20 M/uL    HGB 13.1 11.5 - 16.0 g/dL    HCT 41.1 35.0 - 47.0 %    MCV 94.5 80.0 - 99.0 FL    MCH 30.1 26.0 - 34.0 PG    MCHC 31.9 30.0 - 36.5 g/dL    RDW 12.6 11.5 - 14.5 %    PLATELET 318 093 - 031 K/uL    MPV 10.3 8.9 - 12.9 FL    NRBC 0.0 0  WBC    ABSOLUTE NRBC 0.00 0.00 - 0.01 K/uL    NEUTROPHILS 69 32 - 75 %    LYMPHOCYTES 10 (L) 12 - 49 %    MONOCYTES 19 (H) 5 - 13 %    EOSINOPHILS 1 0 - 7 %    BASOPHILS 1 0 - 1 %    IMMATURE GRANULOCYTES 0 0.0 - 0.5 %    ABS. NEUTROPHILS 4.1 1.8 - 8.0 K/UL    ABS. LYMPHOCYTES 0.6 (L) 0.8 - 3.5 K/UL    ABS. MONOCYTES 1.1 (H) 0.0 - 1.0 K/UL    ABS. EOSINOPHILS 0.1 0.0 - 0.4 K/UL    ABS. BASOPHILS 0.1 0.0 - 0.1 K/UL    ABS. IMM. GRANS. 0.0 0.00 - 0.04 K/UL    DF SMEAR SCANNED      RBC COMMENTS NORMOCYTIC, NORMOCHROMIC     METABOLIC PANEL, COMPREHENSIVE    Collection Time: 11/04/20  6:12 PM   Result Value Ref Range    Sodium 135 (L) 136 - 145 mmol/L    Potassium 4.1 3.5 - 5.1 mmol/L    Chloride 104 97 - 108 mmol/L    CO2 27 21 - 32 mmol/L    Anion gap 4 (L) 5 - 15 mmol/L    Glucose 118 (H) 65 - 100 mg/dL    BUN 23 (H) 6 - 20 MG/DL    Creatinine 1.57 (H) 0.55 - 1.02 MG/DL    BUN/Creatinine ratio 15 12 - 20      GFR est AA 39 (L) >60 ml/min/1.73m2    GFR est non-AA 32 (L) >60 ml/min/1.73m2    Calcium 9.0 8.5 - 10.1 MG/DL    Bilirubin, total 0.3 0.2 - 1.0 MG/DL    ALT (SGPT) 47 12 - 78 U/L    AST (SGOT) 34 15 - 37 U/L    Alk. phosphatase 132 (H) 45 - 117 U/L    Protein, total 8.2 6.4 - 8.2 g/dL    Albumin 3.6 3.5 - 5.0 g/dL    Globulin 4.6 (H) 2.0 - 4.0 g/dL    A-G Ratio 0.8 (L) 1.1 - 2.2     POC LACTIC ACID    Collection Time: 11/04/20  6:14 PM   Result Value Ref Range    Lactic Acid (POC) 1.16 0.40 - 2.00 mmol/L       Radiologic Studies -     CXR Results  (Last 48 hours)               11/04/20 1712  XR CHEST PORT Final result    Impression:  IMPRESSION: Nonspecific ill-defined left basilar patchy pulmonary opacification   consistent with segmental atelectasis or pneumonia.        Narrative:  EXAM: XR CHEST PORT       INDICATION: Chest Pain COMPARISON: 8/25/2020       FINDINGS: A portable AP radiograph of the chest was obtained at 1707 hours. Ill-defined left basilar patchy opacification is severe lungs are otherwise   clear. There is no pneumothorax or pleural effusion. Mild cardiac contour   enlargement and mild thoracic aortic tortuosity appear unchanged. No hilar   enlargement is shown. The bones are mildly osteopenic. Medical Decision Making   I am the first provider for this patient. I reviewed the vital signs, available nursing notes, past medical history, past surgical history, family history and social history. Vital Signs-Reviewed the patient's vital signs. Patient Vitals for the past 12 hrs:   Temp Pulse Resp BP SpO2   11/04/20 1638 100.1 °F (37.8 °C) 85 16 (!) 186/87 94 %       Records Reviewed: Nursing Notes and Old Medical Records    Provider Notes (Medical Decision Making):   Presents the ED with cough and fever with a known diagnosis of COVID-19. Differential diagnosis of bronchitis, pneumonia, URI, viral illness. Patient's vital signs remained stable throughout her ED visit. She was hypertensive on arrival.  She was not tachycardic nor hypoxic throughout her ED stay. She was ambulatory without signs of shortness of breath. Will place patient on Zithromax due to concern of potential pneumonia. Patient also provided zinc and vitamin C to take. ED Course:   Initial assessment performed. The patients presenting problems have been discussed, and they are in agreement with the care plan formulated and outlined with them. I have encouraged them to ask questions as they arise throughout their visit. Critical Care Time: None    Disposition:  DISCHARGE NOTE:  7:23 PM  The pt is ready for discharge. The pt's signs, symptoms, diagnosis, and discharge instructions have been discussed and pt has conveyed their understanding.  The pt is to follow up as recommended or return to ER should their symptoms worsen. Plan has been discussed and pt is in agreement. PLAN:  1. Current Discharge Medication List      START taking these medications    Details   azithromycin (ZITHROMAX) 250 mg tablet Take 1 Tab by mouth daily for 4 days. Qty: 4 Tab, Refills: 0      zinc 50 mg tab tablet Take 1 Tab by mouth two (2) times a day for 10 days. Qty: 20 Tab, Refills: 0      ascorbic acid, vitamin C, (Vitamin C) 500 mg tablet Take 1 Tab by mouth two (2) times a day for 10 days. Qty: 20 Tab, Refills: 0         CONTINUE these medications which have NOT CHANGED    Details   ondansetron (ZOFRAN ODT) 4 mg disintegrating tablet Take 1 Tab by mouth every eight (8) hours as needed for Nausea. Qty: 10 Tab, Refills: 0      loratadine (CLARITIN) 10 mg tablet Take 10 mg by mouth daily as needed for Allergies. For congestion      OTHER DILCIA stockings - on in the morning & remove at bedtime to improve circulation as needed      HYDROcodone-acetaminophen (NORCO) 5-325 mg per tablet Take 1 Tab by mouth every four (4) hours as needed for Pain. Max Daily Amount: 6 Tabs. Qty: 25 Tab, Refills: 0    Associated Diagnoses: Pain      mirabegron (MYRBETRIQ PO) Take 25 mg by mouth daily. cranberry 500 mg capsule Take 500 mg by mouth daily. !! perphenazine (TRILAFON) 8 mg tablet Take 16 mg by mouth. Take 2 tablets by mouth each night at bedtime 16 mg    Associated Diagnoses: Schizoaffective disorder ([address]      CARBOXYMETHYLCELLULOS/GLYCERIN (OPTIVE OP) Apply  to eye three (3) times daily. !! perphenazine (,TRILAFON,TABLET) 4 mg tablet Take 2 Tabs by mouth three (3) times daily. Takes 2 in AM, 2 in PM, and 2 at HS  Qty: 180 Tab, Refills: 6    Associated Diagnoses: Schizoaffective disorder ([address] Drug-induced Parkinson's disease ([address] Obesity; Dependent edema; Varicose veins; Screening for tuberculosis; Incontinence      fluoxetine (PROZAC) 20 mg capsule Take 60 mg by mouth daily.  Take 3 at HS       amitriptyline (ELAVIL) 75 mg tablet Take  by mouth nightly. benztropine (COGENTIN) 0.5 mg tablet Take 1 mg by mouth two (2) times a day. Takes 1 mg TID        !! - Potential duplicate medications found. Please discuss with provider. 2.   Follow-up Information     Follow up With Specialties Details Why Contact Info    Terry Whitley MD Family Medicine In 1 week  [address] [address]  [phone]      [facility] EMERGENCY DEPT Emergency Medicine  If symptoms worsen [address]  [address] [facility]  [phone]      3. Drink plenty of fluids. Return to ED if worse     Diagnosis     Clinical Impression:   1. Pneumonia due to COVID-19 virus    2. Dehydration          Please note that this dictation was completed with [employer], the computer voice recognition software. Quite often unanticipated grammatical, syntax, homophones, and other interpretive errors are inadvertently transcribed by the computer software. Please disregards these errors. Please excuse any errors that have escaped final proofreading.

## 2020-11-04 NOTE — ED NOTES
Pt arrived via EMS for COVID symptoms and fever from [facility]. She is A&Ox 3, disoriented to time, with confusion. Endorses some nausea, dyspnea, cough, fever. Denies chest pain, abd pain. Poor historian.

## 2020-11-05 ENCOUNTER — PATIENT OUTREACH (OUTPATIENT)
Dept: CASE MANAGEMENT | Age: 75
End: 2020-11-05

## 2020-11-05 LAB
COVID-19, XGCOVT: DETECTED
HEALTH STATUS, XMCV2T: ABNORMAL
SOURCE, COVRS: ABNORMAL
SPECIMEN SOURCE, FCOV2M: ABNORMAL
SPECIMEN TYPE, XMCV1T: ABNORMAL

## 2020-11-05 NOTE — DISCHARGE INSTRUCTIONS
Patient Education   Learning About Coronavirus [phone])  Coronavirus [phone]): Overview  What is coronavirus (EVTTW-64)? The coronavirus disease (COVID-19) is caused by a virus. It is an illness that was first found in [place], [place], in December 2019. It has since spread worldwide. The virus can cause fever, cough, and trouble breathing. In severe cases, it can cause pneumonia and make it hard to breathe without help. It can cause death. Coronaviruses are a large group of viruses. They cause the common cold. They also cause more serious illnesses like Middle East respiratory syndrome (MERS) and severe acute respiratory syndrome (SARS). COVID-19 is caused by a novel coronavirus. That means it's a new type that has not been seen in people before. This virus spreads person-to-person through droplets from coughing and sneezing. It can also spread when you are close to someone who is infected. And it can spread when you touch something that has the virus on it, such as a doorknob or a tabletop. What can you do to protect yourself from coronavirus (COVID-19)? The best way to protect yourself from getting sick is to:  · Avoid areas where there is an outbreak. · Avoid contact with people who may be infected. · Wash your hands often with soap or alcohol-based hand sanitizers. · Avoid crowds and try to stay at least 6 feet away from other people. · Wash your hands often, especially after you cough or sneeze. Use soap and water, and scrub for at least 20 seconds. If soap and water aren't available, use an alcohol-based hand . · Avoid touching your mouth, nose, and eyes. What can you do to avoid spreading the virus to others? To help avoid spreading the virus to others:  · Cover your mouth with a tissue when you cough or sneeze. Then throw the tissue in the trash. · Use a disinfectant to clean things that you touch often. · Stay home if you are sick or have been exposed to the virus.  Don't go to school, work, or public areas. And don't use public transportation. · If you are sick:  ? Leave your home only if you need to get medical care. But call the doctor's office first so they know you're coming. And wear a face mask, if you have one.  ? If you have a face mask, wear it whenever you're around other people. It can help stop the spread of the virus when you cough or sneeze. ? Clean and disinfect your home every day. Use household  and disinfectant wipes or sprays. Take special care to clean things that you grab with your hands. These include doorknobs, remote controls, phones, and handles on your refrigerator and microwave. And don't forget countertops, tabletops, bathrooms, and computer keyboards. When to call for help  Call 911 anytime you think you may need emergency care. For example, call if:  · You have severe trouble breathing. (You can't talk at all.)  · You have constant chest pain or pressure. · You are severely dizzy or lightheaded. · You are confused or can't think clearly. · Your face and lips have a blue color. · You pass out (lose consciousness) or are very hard to wake up. Call your doctor now if you develop symptoms such as:  · Shortness of breath. · Fever. · Cough. If you need to get care, call ahead to the doctor's office for instructions before you go. Make sure you wear a face mask, if you have one, to prevent exposing other people to the virus. Where can you get the latest information? The following health organizations are tracking and studying this virus. Their websites contain the most up-to-date information. Saman Vidal also learn what to do if you think you may have been exposed to the virus. · U.S. Centers for Disease Control and Prevention (CDC): The CDC provides updated news about the disease and travel advice. The website also tells you how to prevent the spread of infection.  www.cdc.gov  · World Health Organization [facility]): WHO offers information about the virus outbreaks. WHO also has travel advice. www.who.int  Current as of: April 1, 2020               Content Version: 12.4  © 2006-2020 Healthwise, Incorporated. Care instructions adapted under license by your healthcare professional. If you have questions about a medical condition or this instruction, always ask your healthcare professional. [address] any warranty or liability for your use of this information. Patient Education        10 Things to Do When You Have COVID-19    Stay home. Don't go to school, work, or public areas. And don't use public transportation, ride-shares, or taxis unless you have no choice. Leave your home only if you need to get medical care. But call the doctor's office first so they know you're coming. And wear a cloth face cover. Ask before leaving isolation. Talk with your doctor or other health professional about when it will be safe for you to leave isolation. Wear a cloth face cover when you are around other people. It can help stop the spread of the virus when you cough or sneeze. Limit contact with people in your home. If possible, stay in a separate bedroom and use a separate bathroom. Avoid contact with pets and other animals. If possible, have a friend or family member care for them while you're sick. Cover your mouth and nose with a tissue when you cough or sneeze. Then throw the tissue in the trash right away. Wash your hands often, especially after you cough or sneeze. Use soap and water, and scrub for at least 20 seconds. If soap and water aren't available, use an alcohol-based hand . Don't share personal household items. These include bedding, towels, cups and glasses, and eating utensils. Clean and disinfect your home every day. Use household  or disinfectant wipes or sprays. Take special care to clean things that you grab with your hands.  These include doorknobs, remote controls, phones, and handles on your refrigerator and microwave. And don't forget countertops, tabletops, bathrooms, and computer keyboards. Take acetaminophen (Tylenol) to relieve fever and body aches. Read and follow all instructions on the label. Current as of: July 10, 2020               Content Version: 12.6  © 2006-2020 [employer]. Care instructions adapted under license by [employer] (which disclaims liability or warranty for this information). If you have questions about a medical condition or this instruction, always ask your healthcare professional. [place] [address] any warranty or liability for your use of this information. Patient Education        Pneumonia: Care Instructions  Your Care Instructions     Pneumonia is an infection of the lungs. Most cases are caused by infections from bacteria or viruses. Pneumonia may be mild or very severe. If it is caused by bacteria, you will be treated with antibiotics. It may take a few weeks to a few months to recover fully from pneumonia, depending on how sick you were and whether your overall health is good. Follow-up care is a key part of your treatment and safety. Be sure to make and go to all appointments, and call your doctor if you are having problems. It's also a good idea to know your test results and keep a list of the medicines you take. How can you care for yourself at home? · Take your antibiotics exactly as directed. Do not stop taking the medicine just because you are feeling better. You need to take the full course of antibiotics. · Take your medicines exactly as prescribed. Call your doctor if you think you are having a problem with your medicine. · Get plenty of rest and sleep. You may feel weak and tired for a while, but your energy level will improve with time. · To prevent dehydration, drink plenty of fluids, enough so that your urine is light yellow or clear like water.  Choose water and other caffeine-free clear liquids until you feel better. If you have kidney, heart, or liver disease and have to limit fluids, talk with your doctor before you increase the amount of fluids you drink. · Take care of your cough so you can rest. A cough that brings up mucus from your lungs is common with pneumonia. It is one way your body gets rid of the infection. But if coughing keeps you from resting or causes severe fatigue and chest-wall pain, talk to your doctor. He or she may suggest that you take a medicine to reduce the cough. · Use a vaporizer or humidifier to add moisture to your bedroom. Follow the directions for cleaning the machine. · Do not smoke or allow others to smoke around you. Smoke will make your cough last longer. If you need help quitting, talk to your doctor about stop-smoking programs and medicines. These can increase your chances of quitting for good. · Take an over-the-counter pain medicine, such as acetaminophen (Tylenol), ibuprofen (Advil, Motrin), or naproxen (Aleve). Read and follow all instructions on the label. · Do not take two or more pain medicines at the same time unless the doctor told you to. Many pain medicines have acetaminophen, which is Tylenol. Too much acetaminophen (Tylenol) can be harmful. · If you were given a spirometer to measure how well your lungs are working, use it as instructed. This can help your doctor tell how your recovery is going. · To prevent pneumonia in the future, talk to your doctor about getting a flu vaccine (once a year) and a pneumococcal vaccine (one time only for most people). When should you call for help? Call 911 anytime you think you may need emergency care. For example, call if:    · You have severe trouble breathing.    Call your doctor now or seek immediate medical care if:    · You cough up dark brown or bloody mucus (sputum).     · You have new or worse trouble breathing.     · You are dizzy or lightheaded, or you feel like you may faint. Watch closely for changes in your health, and be sure to contact your doctor if:    · You have a new or higher fever.     · You are coughing more deeply or more often.     · You are not getting better after 2 days (48 hours).     · You do not get better as expected. Where can you learn more? Go to http://www.taveras.com/  Enter D336 in the search box to learn more about \"Pneumonia: Care Instructions. \"  Current as of: February 24, 2020               Content Version: 12.6  © 9076-9271 [employer]. Care instructions adapted under license by [employer] (which disclaims liability or warranty for this information). If you have questions about a medical condition or this instruction, always ask your healthcare professional. [address] any warranty or liability for your use of this information. Patient Education        Dehydration: Care Instructions  Your Care Instructions  Dehydration happens when your body loses too much fluid. This might happen when you do not drink enough water or you lose large amounts of fluids from your body because of diarrhea, vomiting, or sweating. Severe dehydration can be life-threatening. Water and minerals called electrolytes help put your body fluids back in balance. Learn the early signs of fluid loss, and drink more fluids to prevent dehydration. Follow-up care is a key part of your treatment and safety. Be sure to make and go to all appointments, and call your doctor if you are having problems. It's also a good idea to know your test results and keep a list of the medicines you take. How can you care for yourself at home? · To prevent dehydration, drink plenty of fluids, enough so that your urine is light yellow or clear like water. Choose water and other caffeine-free clear liquids until you feel better.  If you have kidney, heart, or liver disease and have to limit fluids, talk with your doctor before you increase the amount of fluids you drink. · If you do not feel like eating or drinking, try taking small sips of water, sports drinks, or other rehydration drinks. · Get plenty of rest.  To prevent dehydration  · Add more fluids to your diet and daily routine, unless your doctor has told you not to. · During hot weather, drink more fluids. Drink even more fluids if you exercise a lot. Stay away from drinks with alcohol or caffeine. · Watch for the symptoms of dehydration. These include:  ? A dry, sticky mouth. ? Dark yellow urine, and not much of it. ? Dry and sunken eyes. ? Feeling very tired. · Learn what problems can lead to dehydration. These include:  ? Diarrhea, fever, and vomiting. ? Any illness with a fever, such as pneumonia or the flu. ? Activities that cause heavy sweating, such as endurance races and heavy outdoor work in hot or humid weather. ? Alcohol or drug use or problems caused by quitting their use (withdrawal). ? Certain medicines, such as cold and allergy pills (antihistamines), diet pills (diuretics), and laxatives. ? Certain diseases, such as diabetes, cancer, and heart or kidney disease. When should you call for help? Call 911 anytime you think you may need emergency care. For example, call if:    · You passed out (lost consciousness). Call your doctor now or seek immediate medical care if:    · You are confused and cannot think clearly.     · You are dizzy or lightheaded, or you feel like you may faint.     · You have signs of needing more fluids. You have sunken eyes and a dry mouth, and you pass only a little dark urine.     · You cannot keep fluids down. Watch closely for changes in your health, and be sure to contact your doctor if:    · You are not making tears.     · Your skin is very dry and sags slowly back into place after you pinch it.     · Your mouth and eyes are very dry. Where can you learn more?   Go to http://www.gray.com/  Enter F632 in the search box to learn more about \"Dehydration: Care Instructions. \"  Current as of: June 26, 2019               Content Version: 12.6  © 0153-5472 [employer]. Care instructions adapted under license by [facility] (which disclaims liability or warranty for this information). If you have questions about a medical condition or this instruction, always ask your healthcare professional. [address] any warranty or liability for your use of this information.

## 2020-11-05 NOTE — PROGRESS NOTES
Patient contacted regarding COVID-19  risk. Discussed COVID-19 related testing which was available at this time. Test results were pending. Patient informed of results, if available? No.    Outreach made within 2 business days of discharge: Yes    Care Transition Nurse/ Ambulatory Care Manager/ LPN Care Coordinator contacted the patient by telephone to perform post discharge assessment following ED visit at [id number] [facility]. CTN was advised patient is a resident of [address] [place] [address] on the Memory Care Unit. No further follow up by CTN at this time. GUILLE resolved.

## 2020-11-05 NOTE — ED NOTES
[facility] at bedside. Paperwork completed, report given to transport staff, patient ready for transport. Patient discharged at this time back to facility via [facility].

## 2020-11-26 ENCOUNTER — HOSPITAL ENCOUNTER (EMERGENCY)
Age: 75
Discharge: NURSING, REHAB OR LONG-TERM CARE | End: 2020-11-26
Attending: STUDENT IN AN ORGANIZED HEALTH CARE EDUCATION/TRAINING PROGRAM | Admitting: STUDENT IN AN ORGANIZED HEALTH CARE EDUCATION/TRAINING PROGRAM
Payer: MEDICARE

## 2020-11-26 ENCOUNTER — APPOINTMENT (OUTPATIENT)
Dept: GENERAL RADIOLOGY | Age: 75
End: 2020-11-26
Attending: STUDENT IN AN ORGANIZED HEALTH CARE EDUCATION/TRAINING PROGRAM
Payer: MEDICARE

## 2020-11-26 ENCOUNTER — APPOINTMENT (OUTPATIENT)
Dept: CT IMAGING | Age: 75
End: 2020-11-26
Attending: STUDENT IN AN ORGANIZED HEALTH CARE EDUCATION/TRAINING PROGRAM
Payer: MEDICARE

## 2020-11-26 VITALS
BODY MASS INDEX: 32.78 KG/M2 | TEMPERATURE: 97.9 F | HEART RATE: 68 BPM | HEIGHT: 63 IN | RESPIRATION RATE: 15 BRPM | SYSTOLIC BLOOD PRESSURE: 146 MMHG | OXYGEN SATURATION: 97 % | DIASTOLIC BLOOD PRESSURE: 96 MMHG | WEIGHT: 185 LBS

## 2020-11-26 DIAGNOSIS — M54.50 ACUTE BILATERAL LOW BACK PAIN WITHOUT SCIATICA: Primary | ICD-10-CM

## 2020-11-26 PROCEDURE — 74011250637 HC RX REV CODE- 250/637: Performed by: STUDENT IN AN ORGANIZED HEALTH CARE EDUCATION/TRAINING PROGRAM

## 2020-11-26 PROCEDURE — 99285 EMERGENCY DEPT VISIT HI MDM: CPT

## 2020-11-26 PROCEDURE — 72131 CT LUMBAR SPINE W/O DYE: CPT

## 2020-11-26 PROCEDURE — 72100 X-RAY EXAM L-S SPINE 2/3 VWS: CPT

## 2020-11-26 RX ORDER — NAPROXEN 500 MG/1
500 TABLET ORAL
Qty: 20 TAB | Refills: 0 | Status: SHIPPED | OUTPATIENT
Start: 2020-11-26 | End: 2022-08-03 | Stop reason: CLARIF

## 2020-11-26 RX ORDER — CYCLOBENZAPRINE HCL 5 MG
5 TABLET ORAL
Qty: 9 TAB | Refills: 0 | Status: SHIPPED | OUTPATIENT
Start: 2020-11-26 | End: 2022-08-03 | Stop reason: CLARIF

## 2020-11-26 RX ORDER — ACETAMINOPHEN 325 MG/1
650 TABLET ORAL ONCE
Status: COMPLETED | OUTPATIENT
Start: 2020-11-26 | End: 2020-11-26

## 2020-11-26 RX ORDER — CYCLOBENZAPRINE HCL 10 MG
10 TABLET ORAL
Status: COMPLETED | OUTPATIENT
Start: 2020-11-26 | End: 2020-11-26

## 2020-11-26 RX ADMIN — CYCLOBENZAPRINE 10 MG: 10 TABLET, FILM COATED ORAL at 12:33

## 2020-11-26 RX ADMIN — ACETAMINOPHEN 650 MG: 325 TABLET ORAL at 10:26

## 2020-11-26 NOTE — ED NOTES
Assumed care of pt via EMS stretcher. Pt is A&O x 4 and reports CC of Lower back pain. EMS reports pt is from [address] and has an baseline hx of dementia and tested positive for Covid 19 3 weeks ago.  Pt is on the monitor x 2, VSS at this time will continue to monitor      1156 Pt stated still having back pain after tylenol administration, liban SOL     1234 Pt resting in stretcher in POC, Watching tv, will continue to monitor     1330 Pt resting in stretcher in POC, WILL CONTINUE TO MONITOR     [place] on Pt arrival back to facility

## 2020-11-26 NOTE — ED PROVIDER NOTES
EMERGENCY DEPARTMENT HISTORY AND PHYSICAL EXAM      Date: 11/26/2020  Patient Name: Erik Ji    History of Presenting Illness     Chief Complaint   Patient presents with    Back Pain     Pt arrived to ED from [facility] with lower back pain x 2 days. Pt covid + 3 wks ago, fever free x 2 days. Pt with hx of dementia. HPI: Erik Ji, 67 y.o. female presents to the ED with cc of low back pain. She states that this has been going on for \" quite some time\" she is unable to tell me exactly how long. She reports diffuse low back pain. No history of trauma, no weakness or numbness in the lower extremities, no difficulty defecating or urinating. Denies any abdominal pain, nausea, vomiting, fevers. There are no other complaints, changes, or physical findings at this time. PCP: Sharyn Hunter MD    No current facility-administered medications on file prior to encounter. Current Outpatient Medications on File Prior to Encounter   Medication Sig Dispense Refill    ondansetron (ZOFRAN ODT) 4 mg disintegrating tablet Take 1 Tab by mouth every eight (8) hours as needed for Nausea. 10 Tab 0    loratadine (CLARITIN) 10 mg tablet Take 10 mg by mouth daily as needed for Allergies. For congestion      OTHER DILCIA stockings - on in the morning & remove at bedtime to improve circulation as needed      HYDROcodone-acetaminophen (NORCO) 5-325 mg per tablet Take 1 Tab by mouth every four (4) hours as needed for Pain. Max Daily Amount: 6 Tabs. 25 Tab 0    mirabegron (MYRBETRIQ PO) Take 25 mg by mouth daily.  cranberry 500 mg capsule Take 500 mg by mouth daily.  perphenazine (TRILAFON) 8 mg tablet Take 16 mg by mouth. Take 2 tablets by mouth each night at bedtime 16 mg      CARBOXYMETHYLCELLULOS/GLYCERIN (OPTIVE OP) Apply  to eye three (3) times daily.  perphenazine (,TRILAFON,TABLET) 4 mg tablet Take 2 Tabs by mouth three (3) times daily.  Takes 2 in AM, 2 in PM, and 2 at [facility] (Patient taking differently: Take 16 mg by mouth nightly. 2 TABLETS) 180 Tab 6    fluoxetine (PROZAC) 20 mg capsule Take 60 mg by mouth daily. Take 3 at HS       amitriptyline (ELAVIL) 75 mg tablet Take  by mouth nightly.  benztropine (COGENTIN) 0.5 mg tablet Take 1 mg by mouth two (2) times a day. Takes 1 mg TID          Past History     Past Medical History:  Past Medical History:   Diagnosis Date    Anemia     Arthritis     Asthma     Cancer ([facility] [address]     skin    Conjunctivitis     Depression     Edema     History of Bell's palsy 3/30/2010    Obesity, unspecified     paranoid schizophrenia     Parkinson's disease     Psychiatric disorder     schizoaffective    urinary incontinence     urinary incontinence    Varicose veins        Past Surgical History:  Past Surgical History:   Procedure Laterality Date    HX DILATION AND CURETTAGE      HX OTHER SURGICAL  12/06/2018    BIOPSY ON RT. THIGH / DR. EVERARDO MOSLEY       Family History:  Family History   Problem Relation Age of Onset    Breast Cancer Maternal Grandmother     Cancer Maternal Grandmother         breast       Social History:  Social History     Tobacco Use    Smoking status: Never Smoker    Smokeless tobacco: Never Used   Substance Use Topics    Alcohol use: No    Drug use: Yes     Types: Prescription       Allergies: Allergies   Allergen Reactions    Haldol [Haloperidol Lactate] Other (comments)     Did not like how it made  Her feel         Review of Systems   no fever  No eye pain  No ear pain  no shortness of breath  no chest pain  no abdominal pain  no dysuria  no leg pain  No rash  No lymphadenopathy  No weight loss    Physical Exam   Physical Exam  Constitutional:       Appearance: Normal appearance. HENT:      Head: Normocephalic and atraumatic. Nose: Nose normal.      Mouth/Throat:      Mouth: Mucous membranes are moist.   Eyes:      Extraocular Movements: Extraocular movements intact.    Neck: Musculoskeletal: Neck supple. Cardiovascular:      Rate and Rhythm: Normal rate and regular rhythm. Pulmonary:      Effort: Pulmonary effort is normal.      Breath sounds: Normal breath sounds. Abdominal:      Palpations: Abdomen is soft. Tenderness: There is no abdominal tenderness. Musculoskeletal:      Comments: Slight diffuse tenderness in the lumbar region, no point bony tenderness over the midline   Skin:     General: Skin is warm and dry. Neurological:      General: No focal deficit present. Mental Status: She is alert and oriented to person, place, and time. Comments: 5 out of 5 strength with ankle flexion and extension bilaterally, sensation to light touch intact over lower extremities bilaterally, full range of motion at hips bilaterally, lower extremities are warm and well-perfused   Psychiatric:         Mood and Affect: Mood normal.         Diagnostic Study Results     Labs -   No results found for this or any previous visit (from the past 24 hour(s)). Radiologic Studies -   XR SPINE LUMB 2 OR 3 V    (Results Pending)     CT Results  (Last 48 hours)    None        CXR Results  (Last 48 hours)    None            Medical Decision Making   I am the first provider for this patient. I reviewed the vital signs, available nursing notes, past medical history, past surgical history, family history and social history. Vital Signs-Reviewed the patient's vital signs. Patient Vitals for the past 24 hrs:   Temp Pulse Resp BP SpO2   11/26/20 0945 97.9 °F (36.6 °C) 68 15 135/68 98 %         Provider Notes (Medical Decision Making):   50-year-old female presenting with low back pain. Differential includes muscular pain, spasm versus strain, given age x-ray to assess for any osseous abnormality. No fevers, no concern for any infectious cause, neurologic exam unremarkable, no signs of cauda equina or any other acute neurologic pathology. ED Course:     Initial assessment performed. The patients presenting problems have been discussed, and they are in agreement with the care plan formulated and outlined with them. I have encouraged them to ask questions as they arise throughout their visit. Chest x-ray shows transitional lumbosacral anatomy mild compression of what appears to be the L2 superior endplate with CT recommended. CT scan shows mild central superior endplate compression of L3 which may be relatively acute and a grade 1 degenerative spondylolisthesis thesis. As noted above, her neurologic exam is unremarkable. She is notified of these findings, will follow up closely with her doctor. Return for any new weakness or numbness, any bowel or bladder symptoms. Critical Care Time:         Disposition:  Back to living facility    PLAN:  1. Current Discharge Medication List        2.    Follow-up Information    None       Return to ED if worse     Diagnosis     Clinical Impression: Acute low back pain with mild lumbar endplate compression

## 2020-11-26 NOTE — ED NOTES
Bedside and Verbal shift change report given to Dahlia Saul  (oncoming nurse) by Pat Brown  (offgoing nurse). Report included the following information SBAR, Kardex, ED Summary, [facility] and Recent Results.

## 2022-05-04 ENCOUNTER — APPOINTMENT (OUTPATIENT)
Dept: GENERAL RADIOLOGY | Age: 77
End: 2022-05-04
Attending: EMERGENCY MEDICINE
Payer: MEDICARE

## 2022-05-04 ENCOUNTER — HOSPITAL ENCOUNTER (EMERGENCY)
Age: 77
Discharge: NURSING, REHAB OR LONG-TERM CARE | End: 2022-05-04
Attending: EMERGENCY MEDICINE
Payer: MEDICARE

## 2022-05-04 ENCOUNTER — APPOINTMENT (OUTPATIENT)
Dept: CT IMAGING | Age: 77
End: 2022-05-04
Attending: EMERGENCY MEDICINE
Payer: MEDICARE

## 2022-05-04 VITALS
WEIGHT: 180 LBS | HEART RATE: 72 BPM | SYSTOLIC BLOOD PRESSURE: 168 MMHG | HEIGHT: 62 IN | OXYGEN SATURATION: 94 % | BODY MASS INDEX: 33.13 KG/M2 | DIASTOLIC BLOOD PRESSURE: 97 MMHG | RESPIRATION RATE: 22 BRPM | TEMPERATURE: 99.7 F

## 2022-05-04 DIAGNOSIS — S09.90XA CLOSED HEAD INJURY, INITIAL ENCOUNTER: Primary | ICD-10-CM

## 2022-05-04 LAB
ALBUMIN SERPL-MCNC: 3.4 G/DL (ref 3.5–5)
ALBUMIN/GLOB SERPL: 0.8 {RATIO} (ref 1.1–2.2)
ALP SERPL-CCNC: 100 U/L (ref 45–117)
ALT SERPL-CCNC: 21 U/L (ref 12–78)
ANION GAP SERPL CALC-SCNC: 2 MMOL/L (ref 5–15)
APPEARANCE UR: CLEAR
AST SERPL-CCNC: 20 U/L (ref 15–37)
BACTERIA URNS QL MICRO: NEGATIVE /HPF
BASOPHILS # BLD: 0 K/UL (ref 0–0.1)
BASOPHILS NFR BLD: 0 % (ref 0–1)
BILIRUB SERPL-MCNC: 0.4 MG/DL (ref 0.2–1)
BILIRUB UR QL: NEGATIVE
BUN SERPL-MCNC: 30 MG/DL (ref 6–20)
BUN/CREAT SERPL: 18 (ref 12–20)
CALCIUM SERPL-MCNC: 9.1 MG/DL (ref 8.5–10.1)
CHLORIDE SERPL-SCNC: 102 MMOL/L (ref 97–108)
CO2 SERPL-SCNC: 30 MMOL/L (ref 21–32)
COLOR UR: ABNORMAL
CREAT SERPL-MCNC: 1.63 MG/DL (ref 0.55–1.02)
DIFFERENTIAL METHOD BLD: ABNORMAL
EOSINOPHIL # BLD: 0 K/UL (ref 0–0.4)
EOSINOPHIL NFR BLD: 0 % (ref 0–7)
EPITH CASTS URNS QL MICRO: ABNORMAL /LPF
ERYTHROCYTE [DISTWIDTH] IN BLOOD BY AUTOMATED COUNT: 12.7 % (ref 11.5–14.5)
GLOBULIN SER CALC-MCNC: 4.5 G/DL (ref 2–4)
GLUCOSE SERPL-MCNC: 119 MG/DL (ref 65–100)
GLUCOSE UR STRIP.AUTO-MCNC: NEGATIVE MG/DL
HCT VFR BLD AUTO: 39.3 % (ref 35–47)
HGB BLD-MCNC: 12.8 G/DL (ref 11.5–16)
HGB UR QL STRIP: NEGATIVE
IMM GRANULOCYTES # BLD AUTO: 0.1 K/UL (ref 0–0.04)
IMM GRANULOCYTES NFR BLD AUTO: 1 % (ref 0–0.5)
KETONES UR QL STRIP.AUTO: NEGATIVE MG/DL
LACTATE BLD-SCNC: 1.04 MMOL/L (ref 0.4–2)
LEUKOCYTE ESTERASE UR QL STRIP.AUTO: NEGATIVE
LYMPHOCYTES # BLD: 0.3 K/UL (ref 0.8–3.5)
LYMPHOCYTES NFR BLD: 4 % (ref 12–49)
MCH RBC QN AUTO: 30.2 PG (ref 26–34)
MCHC RBC AUTO-ENTMCNC: 32.6 G/DL (ref 30–36.5)
MCV RBC AUTO: 92.7 FL (ref 80–99)
MONOCYTES # BLD: 0.7 K/UL (ref 0–1)
MONOCYTES NFR BLD: 8 % (ref 5–13)
NEUTS SEG # BLD: 7.2 K/UL (ref 1.8–8)
NEUTS SEG NFR BLD: 87 % (ref 32–75)
NITRITE UR QL STRIP.AUTO: NEGATIVE
NRBC # BLD: 0 K/UL (ref 0–0.01)
NRBC BLD-RTO: 0 PER 100 WBC
PH UR STRIP: 6.5 [PH] (ref 5–8)
PLATELET # BLD AUTO: 222 K/UL (ref 150–400)
PMV BLD AUTO: 9.7 FL (ref 8.9–12.9)
POTASSIUM SERPL-SCNC: 3.9 MMOL/L (ref 3.5–5.1)
PROT SERPL-MCNC: 7.9 G/DL (ref 6.4–8.2)
PROT UR STRIP-MCNC: 30 MG/DL
RBC # BLD AUTO: 4.24 M/UL (ref 3.8–5.2)
RBC #/AREA URNS HPF: ABNORMAL /HPF (ref 0–5)
RBC MORPH BLD: ABNORMAL
SODIUM SERPL-SCNC: 134 MMOL/L (ref 136–145)
SP GR UR REFRACTOMETRY: 1.02
TROPONIN-HIGH SENSITIVITY: 18 NG/L (ref 0–51)
UROBILINOGEN UR QL STRIP.AUTO: 0.2 EU/DL (ref 0.2–1)
WBC # BLD AUTO: 8.3 K/UL (ref 3.6–11)
WBC URNS QL MICRO: ABNORMAL /HPF (ref 0–4)

## 2022-05-04 PROCEDURE — 74011250636 HC RX REV CODE- 250/636: Performed by: EMERGENCY MEDICINE

## 2022-05-04 PROCEDURE — 87040 BLOOD CULTURE FOR BACTERIA: CPT

## 2022-05-04 PROCEDURE — 93005 ELECTROCARDIOGRAM TRACING: CPT

## 2022-05-04 PROCEDURE — 70450 CT HEAD/BRAIN W/O DYE: CPT

## 2022-05-04 PROCEDURE — 99285 EMERGENCY DEPT VISIT HI MDM: CPT

## 2022-05-04 PROCEDURE — 84484 ASSAY OF TROPONIN QUANT: CPT

## 2022-05-04 PROCEDURE — 36415 COLL VENOUS BLD VENIPUNCTURE: CPT

## 2022-05-04 PROCEDURE — 71045 X-RAY EXAM CHEST 1 VIEW: CPT

## 2022-05-04 PROCEDURE — 80053 COMPREHEN METABOLIC PANEL: CPT

## 2022-05-04 PROCEDURE — 85025 COMPLETE CBC W/AUTO DIFF WBC: CPT

## 2022-05-04 PROCEDURE — 83605 ASSAY OF LACTIC ACID: CPT

## 2022-05-04 PROCEDURE — 81001 URINALYSIS AUTO W/SCOPE: CPT

## 2022-05-04 RX ADMIN — SODIUM CHLORIDE 500 ML: 9 INJECTION, SOLUTION INTRAVENOUS at 14:59

## 2022-05-04 NOTE — ED PROVIDER NOTES
EMERGENCY DEPARTMENT HISTORY AND PHYSICAL EXAM      Date: 5/4/2022  Patient Name: Zita Abernathy    History of Presenting Illness     Chief Complaint   Patient presents with    Altered mental status     Lives in memory care facility and off her baseline altered, mucous membranes very dry, fell yesterday without injury       History Provided By: Patient    HPI: Zita Abernathy, 67 y.o. female presents to the ED with cc of AMS. 21-year-old female with a history as below notable for dementia, schizophrenia and UTI presents to the emergency department with altered mental status. Patient arrives via EMS. Patient is not able to provide significant history. Per EMS staff reports patient was not responding like usual and more lethargic. Awake and oriented x1 on my assessment. Denies any complaints. There are no other complaints, changes, or physical findings at this time. PCP: Anton Cameron MD    No current facility-administered medications on file prior to encounter. Current Outpatient Medications on File Prior to Encounter   Medication Sig Dispense Refill    naproxen (NAPROSYN) 500 mg tablet Take 1 Tab by mouth every twelve (12) hours as needed for Pain. 20 Tab 0    cyclobenzaprine (FLEXERIL) 5 mg tablet Take 1 Tab by mouth three (3) times daily as needed for Muscle Spasm(s). 9 Tab 0    ondansetron (ZOFRAN ODT) 4 mg disintegrating tablet Take 1 Tab by mouth every eight (8) hours as needed for Nausea. 10 Tab 0    loratadine (CLARITIN) 10 mg tablet Take 10 mg by mouth daily as needed for Allergies. For congestion      OTHER DILCIA stockings - on in the morning & remove at bedtime to improve circulation as needed      HYDROcodone-acetaminophen (NORCO) 5-325 mg per tablet Take 1 Tab by mouth every four (4) hours as needed for Pain. Max Daily Amount: 6 Tabs. 25 Tab 0    mirabegron (MYRBETRIQ PO) Take 25 mg by mouth daily.  cranberry 500 mg capsule Take 500 mg by mouth daily.       perphenazine (TRILAFON) 8 mg tablet Take 16 mg by mouth. Take 2 tablets by mouth each night at bedtime 16 mg      CARBOXYMETHYLCELLULOS/GLYCERIN (OPTIVE OP) Apply  to eye three (3) times daily.  perphenazine (,TRILAFON,TABLET) 4 mg tablet Take 2 Tabs by mouth three (3) times daily. Takes 2 in AM, 2 in PM, and 2 at HS (Patient taking differently: Take 16 mg by mouth nightly. 2 TABLETS) 180 Tab 6    fluoxetine (PROZAC) 20 mg capsule Take 60 mg by mouth daily. Take 3 at HS       amitriptyline (ELAVIL) 75 mg tablet Take  by mouth nightly.  benztropine (COGENTIN) 0.5 mg tablet Take 1 mg by mouth two (2) times a day. Takes 1 mg TID          Past History     Past Medical History:  Past Medical History:   Diagnosis Date    Anemia     Arthritis     Asthma     Cancer ([facility] [address]     skin    Conjunctivitis     Depression     Edema     History of Bell's palsy 3/30/2010    Obesity, unspecified     paranoid schizophrenia     Parkinson's disease     Psychiatric disorder     schizoaffective    urinary incontinence     urinary incontinence    Varicose veins        Past Surgical History:  Past Surgical History:   Procedure Laterality Date    HX DILATION AND CURETTAGE      HX OTHER SURGICAL  12/06/2018    BIOPSY ON RT. THIGH / DR. EVERARDO MOSLEY       Family History:  Family History   Problem Relation Age of Onset    Breast Cancer Maternal Grandmother     Cancer Maternal Grandmother         breast       Social History:  Social History     Tobacco Use    Smoking status: Never Smoker    Smokeless tobacco: Never Used   Substance Use Topics    Alcohol use: No    Drug use: Yes     Types: Prescription       Allergies: Allergies   Allergen Reactions    Haldol [Haloperidol Lactate] Other (comments)     Did not like how it made  Her feel         Review of Systems   Review of Systems   Unable to perform ROS: Mental status change       Physical Exam   Physical Exam  Vitals and nursing note reviewed.    Constitutional: Comments: 79-year-old female, resting on stretcher, no acute distress   HENT:      Head: Normocephalic and atraumatic. Mouth/Throat:      Comments: Dry mucous membranes  Cardiovascular:      Rate and Rhythm: Normal rate and regular rhythm. Heart sounds: No murmur heard. No friction rub. No gallop. Pulmonary:      Effort: Pulmonary effort is normal.      Breath sounds: Normal breath sounds. Abdominal:      Palpations: Abdomen is soft. Tenderness: There is no abdominal tenderness. Musculoskeletal:         General: Normal range of motion. Cervical back: Normal range of motion. Skin:     General: Skin is warm. Neurological:      General: No focal deficit present. Mental Status: She is lethargic and disoriented. Cranial Nerves: No cranial nerve deficit. Sensory: No sensory deficit. Motor: No weakness. Psychiatric:         Mood and Affect: Mood normal.         Diagnostic Study Results     Labs -     Recent Results (from the past 12 hour(s))   TROPONIN-HIGH SENSITIVITY    Collection Time: 05/04/22  2:29 PM   Result Value Ref Range    Troponin-High Sensitivity 18 0 - 51 ng/L   CBC WITH AUTOMATED DIFF    Collection Time: 05/04/22  2:29 PM   Result Value Ref Range    WBC 8.3 3.6 - 11.0 K/uL    RBC 4.24 3.80 - 5.20 M/uL    HGB 12.8 11.5 - 16.0 g/dL    HCT 39.3 35.0 - 47.0 %    MCV 92.7 80.0 - 99.0 FL    MCH 30.2 26.0 - 34.0 PG    MCHC 32.6 30.0 - 36.5 g/dL    RDW 12.7 11.5 - 14.5 %    PLATELET 958 693 - 336 K/uL    MPV 9.7 8.9 - 12.9 FL    NRBC 0.0 0  WBC    ABSOLUTE NRBC 0.00 0.00 - 0.01 K/uL    NEUTROPHILS 87 (H) 32 - 75 %    LYMPHOCYTES 4 (L) 12 - 49 %    MONOCYTES 8 5 - 13 %    EOSINOPHILS 0 0 - 7 %    BASOPHILS 0 0 - 1 %    IMMATURE GRANULOCYTES 1 (H) 0.0 - 0.5 %    ABS. NEUTROPHILS 7.2 1.8 - 8.0 K/UL    ABS. LYMPHOCYTES 0.3 (L) 0.8 - 3.5 K/UL    ABS. MONOCYTES 0.7 0.0 - 1.0 K/UL    ABS. EOSINOPHILS 0.0 0.0 - 0.4 K/UL    ABS. BASOPHILS 0.0 0.0 - 0.1 K/UL    ABS. IMM. Debbe Clay. 0.1 (H) 0.00 - 0.04 K/UL    DF SMEAR SCANNED      RBC COMMENTS NORMOCYTIC, NORMOCHROMIC     METABOLIC PANEL, COMPREHENSIVE    Collection Time: 05/04/22  2:29 PM   Result Value Ref Range    Sodium 134 (L) 136 - 145 mmol/L    Potassium 3.9 3.5 - 5.1 mmol/L    Chloride 102 97 - 108 mmol/L    CO2 30 21 - 32 mmol/L    Anion gap 2 (L) 5 - 15 mmol/L    Glucose 119 (H) 65 - 100 mg/dL    BUN 30 (H) 6 - 20 MG/DL    Creatinine 1.63 (H) 0.55 - 1.02 MG/DL    BUN/Creatinine ratio 18 12 - 20      GFR est AA 37 (L) >60 ml/min/1.73m2    GFR est non-AA 31 (L) >60 ml/min/1.73m2    Calcium 9.1 8.5 - 10.1 MG/DL    Bilirubin, total 0.4 0.2 - 1.0 MG/DL    ALT (SGPT) 21 12 - 78 U/L    AST (SGOT) 20 15 - 37 U/L    Alk. phosphatase 100 45 - 117 U/L    Protein, total 7.9 6.4 - 8.2 g/dL    Albumin 3.4 (L) 3.5 - 5.0 g/dL    Globulin 4.5 (H) 2.0 - 4.0 g/dL    A-G Ratio 0.8 (L) 1.1 - 2.2     POC LACTIC ACID    Collection Time: 05/04/22  2:31 PM   Result Value Ref Range    Lactic Acid (POC) 1.04 0.40 - 2.00 mmol/L   URINALYSIS W/ RFLX MICROSCOPIC    Collection Time: 05/04/22  3:17 PM   Result Value Ref Range    Color YELLOW/STRAW      Appearance CLEAR CLEAR      Specific gravity 1.020      pH (UA) 6.5 5.0 - 8.0      Protein 30 (A) NEG mg/dL    Glucose Negative NEG mg/dL    Ketone Negative NEG mg/dL    Bilirubin Negative NEG      Blood Negative NEG      Urobilinogen 0.2 0.2 - 1.0 EU/dL    Nitrites Negative NEG      Leukocyte Esterase Negative NEG      WBC 0-4 0 - 4 /hpf    RBC 0-5 0 - 5 /hpf    Epithelial cells FEW FEW /lpf    Bacteria Negative NEG /hpf       Radiologic Studies -   XR CHEST PORT   Final Result   No acute cardiopulmonary process seen      CT HEAD WO CONT   Final Result   No acute intracranial hemorrhage, mass or infarct. CT Results  (Last 48 hours)               05/04/22 1448  CT HEAD WO CONT Final result    Impression:  No acute intracranial hemorrhage, mass or infarct.             Narrative:  INDICATION: AMS Exam: Noncontrast CT of the brain is performed with 5 mm collimation. CT dose reduction was achieved with the use of the standardized protocol   tailored for this examination and automatic exposure control for dose   modulation. FINDINGS: There is no acute intracranial hemorrhage, mass, mass effect or   herniation. Ventricular system is normal. There are confluent periventricular   white matter hypodensities, consistent with chronic microvascular ischemic   changes, but increased compared to prior CT dated September 2010. There is no   evidence of acute territorial infarct. The mastoid air cells are well   pneumatized. The visualized paranasal sinuses are normal.               CXR Results  (Last 48 hours)               05/04/22 1534  XR CHEST PORT Final result    Impression:  No acute cardiopulmonary process seen       Narrative:  EXAM: XR CHEST PORT       INDICATION: Acute mental status change, eval for infiltrate       COMPARISON: 11/4/2020       FINDINGS: A portable AP radiograph of the chest was obtained at 1519 hours. The   patient is on a cardiac monitor. The lungs are clear. The cardiac and   mediastinal contours are remarkable for moderate tortuosity of the descending   aorta. There is central congestion without overt CHF. The bones and soft tissues   are grossly within normal limits. Medical Decision Making   I am the first provider for this patient. I reviewed the vital signs, available nursing notes, past medical history, past surgical history, family history and social history. Vital Signs-Reviewed the patient's vital signs.   Patient Vitals for the past 12 hrs:   Temp Pulse Resp BP SpO2   05/04/22 1502 -- 72 22 (!) 168/97 94 %   05/04/22 1402 -- -- -- -- 95 %   05/04/22 1337 99.7 °F (37.6 °C) 77 18 (!) 183/89 99 %     Records Reviewed: Nursing Notes and Old Medical Records    Provider Notes (Medical Decision Making):     79-year-old female with a history as above presents emergency department with altered mental status. Vitals notable for mild hypertension, afebrile. Will check basic labs including sepsis screening labs. Suspect toxic metabolic causes. Will CT head given report of fall. Doubt acute CVA given no focal deficits. ED Course:   Initial assessment performed. The patients presenting problems have been discussed, and they are in agreement with the care plan formulated and outlined with them. I have encouraged them to ask questions as they arise throughout their visit. ED Course as of 05/04/22 2222   Wed May 04, 2022   1408 EKG interpreted by me. Shows NSR with a HR of 76. No ST elevations or depressions concerning for ischemia. Normal intervals. [MB]   1515 CBC and CMP are unremarkable. Troponin nonspecifically elevated 18. Lactate reassuring. CT head reviewed. [MB]   1628 Patient reassessed, resting in bed. Discussed with medical POA. Discussed negative CT head, labs, chest x-ray. She reports patient is currently baseline. Patient will be discharged back to skilled nursing facility. [MB]      ED Course User Index  [MB] MD Mary Lou Morris MD      Disposition:    Discharged    DISCHARGE PLAN:  1. Discharge Medication List as of 5/4/2022  4:32 PM        2. Follow-up Information     Follow up With Specialties Details Why Contact Info    Esau Roberson MD Family Medicine In 3 days  [facility]  [phone]      \[facility]\"" EMERGENCY DEPT Emergency Medicine  If symptoms worsen [address] [facility]  [address] [facility]  [phone]        3. Return to ED if worse     Diagnosis     Clinical Impression:   1. Closed head injury, initial encounter        Attestations:    Mary Lou Child MD    Please note that this dictation was completed with [employer], the [employer] voice recognition software.   Quite often unanticipated grammatical, syntax, homophones, and other interpretive errors are inadvertently transcribed by the computer software. Please disregard these errors. Please excuse any errors that have escaped final proofreading. Thank you.

## 2022-05-04 NOTE — DISCHARGE INSTRUCTIONS
Kaylen Samano was seen in the ER after her fall. The CAT scan of her head was normal as well as an x-ray and labs and urine. This may be a mild concussion. Please follow-up with her primary doctor and return for new or worsening symptoms.

## 2022-05-04 NOTE — ED NOTES
Discharge instructions given and reviewed w/ POA. Implied understanding. VSS. No additional questions or concerns voiced or problems noted.  Pt discharged to SNF via POA

## 2022-05-05 LAB
ATRIAL RATE: 76 BPM
CALCULATED P AXIS, ECG09: 35 DEGREES
CALCULATED R AXIS, ECG10: -46 DEGREES
CALCULATED T AXIS, ECG11: -7 DEGREES
DIAGNOSIS, 93000: NORMAL
P-R INTERVAL, ECG05: 192 MS
Q-T INTERVAL, ECG07: 382 MS
QRS DURATION, ECG06: 86 MS
QTC CALCULATION (BEZET), ECG08: 429 MS
VENTRICULAR RATE, ECG03: 76 BPM

## 2022-05-09 LAB
BACTERIA SPEC CULT: NORMAL
SERVICE CMNT-IMP: NORMAL

## 2022-05-20 ENCOUNTER — TRANSCRIBE ORDER (OUTPATIENT)
Dept: SCHEDULING | Age: 77
End: 2022-05-20

## 2022-05-20 DIAGNOSIS — R79.89 ELEVATED SERUM CREATININE: Primary | ICD-10-CM

## 2022-05-20 DIAGNOSIS — R74.8 ELEVATED CREATINE KINASE: ICD-10-CM

## 2022-05-24 ENCOUNTER — HOSPITAL ENCOUNTER (OUTPATIENT)
Dept: ULTRASOUND IMAGING | Age: 77
Discharge: HOME | End: 2022-05-24
Attending: FAMILY MEDICINE
Payer: MEDICARE

## 2022-05-24 DIAGNOSIS — R74.8 ELEVATED CREATINE KINASE: ICD-10-CM

## 2022-05-24 DIAGNOSIS — R79.89 ELEVATED SERUM CREATININE: ICD-10-CM

## 2022-05-24 PROCEDURE — 76770 US EXAM ABDO BACK WALL COMP: CPT

## 2022-08-03 ENCOUNTER — HOSPITAL ENCOUNTER (EMERGENCY)
Age: 77
Discharge: HOME | End: 2022-08-03
Attending: EMERGENCY MEDICINE
Payer: MEDICARE

## 2022-08-03 ENCOUNTER — APPOINTMENT (OUTPATIENT)
Dept: GENERAL RADIOLOGY | Age: 77
End: 2022-08-03
Attending: EMERGENCY MEDICINE
Payer: MEDICARE

## 2022-08-03 VITALS
SYSTOLIC BLOOD PRESSURE: 104 MMHG | DIASTOLIC BLOOD PRESSURE: 65 MMHG | BODY MASS INDEX: 39.43 KG/M2 | RESPIRATION RATE: 16 BRPM | HEIGHT: 62 IN | WEIGHT: 214.29 LBS | TEMPERATURE: 97.9 F | HEART RATE: 66 BPM | OXYGEN SATURATION: 96 %

## 2022-08-03 DIAGNOSIS — S29.012A STRAIN OF THORACIC BACK REGION: Primary | ICD-10-CM

## 2022-08-03 DIAGNOSIS — M47.816 OSTEOARTHRITIS OF LUMBAR SPINE, UNSPECIFIED SPINAL OSTEOARTHRITIS COMPLICATION STATUS: ICD-10-CM

## 2022-08-03 DIAGNOSIS — M85.88 OSTEOPENIA OF LUMBAR SPINE: ICD-10-CM

## 2022-08-03 PROCEDURE — 72072 X-RAY EXAM THORAC SPINE 3VWS: CPT

## 2022-08-03 PROCEDURE — 72100 X-RAY EXAM L-S SPINE 2/3 VWS: CPT

## 2022-08-03 PROCEDURE — 99283 EMERGENCY DEPT VISIT LOW MDM: CPT

## 2022-08-03 PROCEDURE — 74011000250 HC RX REV CODE- 250: Performed by: EMERGENCY MEDICINE

## 2022-08-03 PROCEDURE — 74011250637 HC RX REV CODE- 250/637: Performed by: EMERGENCY MEDICINE

## 2022-08-03 PROCEDURE — 71046 X-RAY EXAM CHEST 2 VIEWS: CPT

## 2022-08-03 RX ORDER — LIDOCAINE 4 G/100G
1 PATCH TOPICAL EVERY 12 HOURS
Qty: 6 PATCH | Refills: 0 | Status: SHIPPED | OUTPATIENT
Start: 2022-08-03

## 2022-08-03 RX ORDER — IBUPROFEN 600 MG/1
600 TABLET ORAL
Qty: 20 TABLET | Refills: 0 | Status: SHIPPED | OUTPATIENT
Start: 2022-08-03

## 2022-08-03 RX ORDER — IBUPROFEN 600 MG/1
600 TABLET ORAL
Status: COMPLETED | OUTPATIENT
Start: 2022-08-03 | End: 2022-08-03

## 2022-08-03 RX ORDER — OXYCODONE AND ACETAMINOPHEN 5; 325 MG/1; MG/1
1 TABLET ORAL
Status: COMPLETED | OUTPATIENT
Start: 2022-08-03 | End: 2022-08-03

## 2022-08-03 RX ORDER — LIDOCAINE 4 G/100G
1 PATCH TOPICAL
Status: DISCONTINUED | OUTPATIENT
Start: 2022-08-03 | End: 2022-08-03 | Stop reason: HOSPADM

## 2022-08-03 RX ORDER — METHOCARBAMOL 750 MG/1
750 TABLET, FILM COATED ORAL
Status: COMPLETED | OUTPATIENT
Start: 2022-08-03 | End: 2022-08-03

## 2022-08-03 RX ORDER — HYDROCODONE BITARTRATE AND ACETAMINOPHEN 5; 325 MG/1; MG/1
1 TABLET ORAL
Status: COMPLETED | OUTPATIENT
Start: 2022-08-03 | End: 2022-08-03

## 2022-08-03 RX ORDER — ONDANSETRON 4 MG/1
4 TABLET, ORALLY DISINTEGRATING ORAL
Status: COMPLETED | OUTPATIENT
Start: 2022-08-03 | End: 2022-08-03

## 2022-08-03 RX ORDER — METHOCARBAMOL 750 MG/1
750 TABLET, FILM COATED ORAL
Qty: 20 TABLET | Refills: 0 | Status: SHIPPED | OUTPATIENT
Start: 2022-08-03

## 2022-08-03 RX ADMIN — METHOCARBAMOL TABLETS 750 MG: 750 TABLET, COATED ORAL at 12:52

## 2022-08-03 RX ADMIN — IBUPROFEN 600 MG: 600 TABLET ORAL at 12:51

## 2022-08-03 RX ADMIN — HYDROCODONE BITARTRATE AND ACETAMINOPHEN 1 TABLET: 5; 325 TABLET ORAL at 11:24

## 2022-08-03 RX ADMIN — ONDANSETRON 4 MG: 4 TABLET, ORALLY DISINTEGRATING ORAL at 11:24

## 2022-08-03 RX ADMIN — OXYCODONE AND ACETAMINOPHEN 1 TABLET: 5; 325 TABLET ORAL at 12:51

## 2022-08-03 NOTE — ED NOTES
Pt stated she wanted to die however when asked if she wanted to hurt herself or others she responded no. Dr. Margie Gold at bedside to assess.

## 2022-08-03 NOTE — ED PROVIDER NOTES
EMERGENCY DEPARTMENT HISTORY AND PHYSICAL EXAM      Date: 8/3/2022  Patient Name: Kavitha Heart    History of Presenting Illness     Chief Complaint   Patient presents with    Back Pain     Mid back pain sp GLF. Mechanical, denies LOC, denies dizziness. History Provided By: Patient, EMS, and Caregiver    HPI: Kavitha Heart, 67 y.o. female presents to the ED with cc of mid back pain. The patient is a resident of an assisted living facility. According to EMS, she had a mechanical fall today. She did not lose consciousness, and the patient denies dizziness. She has a history of schizoaffective disorder, and has a power of . Patient has not indicated how severe her pain is. She denies headache, neck pain, chest pain, cough or shortness of breath. She denies leg pain, but states when she raises her legs, it makes Her back hurt. She stated that she does not want to live, twice, but when I asked if she wants to hurt her self she denies it, and states she is not homicidal.  Spoke to her power of  about that, and she thinks that it may be due to the patient's pain. She says normally patient does not express suicidal thoughts. Patient is not on a blood thinner, and she has not received anything for pain prior to arrival.    There are no other complaints, changes, or physical findings at this time. PCP: Naseem Morse MD    No current facility-administered medications on file prior to encounter. Current Outpatient Medications on File Prior to Encounter   Medication Sig Dispense Refill    naproxen (NAPROSYN) 500 mg tablet Take 1 Tab by mouth every twelve (12) hours as needed for Pain. 20 Tab 0    cyclobenzaprine (FLEXERIL) 5 mg tablet Take 1 Tab by mouth three (3) times daily as needed for Muscle Spasm(s). 9 Tab 0    ondansetron (ZOFRAN ODT) 4 mg disintegrating tablet Take 1 Tab by mouth every eight (8) hours as needed for Nausea.  10 Tab 0    loratadine (CLARITIN) 10 mg tablet Take 10 mg by mouth daily as needed for Allergies. For congestion      OTHER DILCIA stockings - on in the morning & remove at bedtime to improve circulation as needed      HYDROcodone-acetaminophen (NORCO) 5-325 mg per tablet Take 1 Tab by mouth every four (4) hours as needed for Pain. Max Daily Amount: 6 Tabs. 25 Tab 0    mirabegron (MYRBETRIQ PO) Take 25 mg by mouth daily. cranberry 500 mg capsule Take 500 mg by mouth daily. perphenazine (TRILAFON) 8 mg tablet Take 16 mg by mouth. Take 2 tablets by mouth each night at bedtime 16 mg      CARBOXYMETHYLCELLULOS/GLYCERIN (OPTIVE OP) Apply  to eye three (3) times daily. perphenazine (,TRILAFON,TABLET) 4 mg tablet Take 2 Tabs by mouth three (3) times daily. Takes 2 in AM, 2 in PM, and 2 at HS (Patient taking differently: Take 16 mg by mouth nightly. 2 TABLETS) 180 Tab 6    fluoxetine (PROZAC) 20 mg capsule Take 60 mg by mouth daily. Take 3 at HS       amitriptyline (ELAVIL) 75 mg tablet Take  by mouth nightly. benztropine (COGENTIN) 0.5 mg tablet Take 1 mg by mouth two (2) times a day. Takes 1 mg TID          Past History     Past Medical History:  Past Medical History:   Diagnosis Date    Anemia     Arthritis     Asthma     Cancer ([facility] [address]     skin    Conjunctivitis     Depression     Edema     History of Bell's palsy 3/30/2010    Obesity, unspecified     paranoid schizophrenia     Parkinson's disease     Psychiatric disorder     schizoaffective    urinary incontinence     urinary incontinence    Varicose veins        Past Surgical History:  Past Surgical History:   Procedure Laterality Date    HX DILATION AND CURETTAGE      HX OTHER SURGICAL  12/06/2018    BIOPSY ON RT. THIGH / DR. EVERARDO MOSLEY       Family History:  Family History   Problem Relation Age of Onset    Breast Cancer Maternal Grandmother     Cancer Maternal Grandmother         breast       Social History:  Social History     Tobacco Use    Smoking status: Never    Smokeless tobacco: Never Substance Use Topics    Alcohol use: No    Drug use: Yes     Types: Prescription       Allergies: Allergies   Allergen Reactions    Haldol [Haloperidol Lactate] Other (comments)     Did not like how it made  Her feel         Review of Systems   Review of Systems   Constitutional:  Negative for fever. HENT:  Negative for congestion. Eyes: Negative. Respiratory:  Negative for shortness of breath. Cardiovascular:  Negative for chest pain. Gastrointestinal:  Negative for abdominal pain. Endocrine: Negative for heat intolerance. Genitourinary: Negative. Musculoskeletal:  Negative for back pain. Skin:  Negative for rash. Allergic/Immunologic: Negative for immunocompromised state. Neurological:  Negative for headaches. Hematological:  Does not bruise/bleed easily. Psychiatric/Behavioral: Negative. She stated that she wanted to die, cannot live like this, but denies wanting to hurt her self. All other systems reviewed and are negative. Physical Exam   Physical Exam  Vitals and nursing note reviewed. Constitutional:       General: She is not in acute distress. Appearance: She is well-developed. HENT:      Head: Normocephalic. Cardiovascular:      Rate and Rhythm: Normal rate and regular rhythm. Pulses: Normal pulses. Heart sounds: Normal heart sounds. Pulmonary:      Effort: Pulmonary effort is normal.      Breath sounds: Normal breath sounds. Abdominal:      General: Bowel sounds are normal.      Palpations: Abdomen is soft. Tenderness: There is no abdominal tenderness. Comments: Mid back tenderness   Musculoskeletal:         General: Normal range of motion. Cervical back: Normal range of motion and neck supple. Comments: Decreased range of motion in lower extremities, secondary to eliciting back pain   Skin:     General: Skin is warm and dry. Neurological:      General: No focal deficit present.       Mental Status: She is alert and oriented to person, place, and time. Psychiatric:         Mood and Affect: Mood normal.         Behavior: Behavior normal.       Diagnostic Study Results     Labs -   No results found for this or any previous visit (from the past 12 hour(s)). Radiologic Studies -   XR CHEST PA LAT   Final Result   No acute cardiopulmonary process. XR SPINE LUMB 2 OR 3 V   Final Result   1. Severe osteopenia. Scoliosis with mild degenerative change. There is high   clinical concern for fracture MR imaging is recommended          XR SPINE THORAC 3 V   Final Result   1. Osteopenia. An acute fracture is not demonstrated   2. Prominence of the right hilum. Recommend PA and lateral chest with good   inspiration to further evaluate        CT Results  (Last 48 hours)      None          CXR Results  (Last 48 hours)      None            Medical Decision Making   I am the first provider for this patient. I reviewed the vital signs, available nursing notes, past medical history, past surgical history, family history and social history. Vital Signs-Reviewed the patient's vital signs. Patient Vitals for the past 12 hrs:   Temp Pulse Resp BP SpO2   08/03/22 1015 -- -- -- -- 95 %   08/03/22 1009 97.9 °F (36.6 °C) 66 16 (!) 177/80 92 %       Records Reviewed: Nursing Notes, Old Medical Records, and Ambulance Run Sheet    Provider Notes (Medical Decision Making):   Fracture, sprain, contusion,    ED Course:   Initial assessment performed. The patients presenting problems have been discussed, and they are in agreement with the care plan formulated and outlined with them. I have encouraged them to ask questions as they arise throughout their visit. progress note:    Patient is feeling better. Her results were reviewed. She denies homicidal or suicidal ideation. She does not want to hurt her self.   She is advised to follow-up with PCP and return to ER if worse        Critical Care Time:   0      Disposition:  Assisted living    DISCHARGE PLAN:  1. Current Discharge Medication List        START taking these medications    Details   ibuprofen (MOTRIN) 600 mg tablet Take 1 Tablet by mouth every six (6) hours as needed for Pain. Qty: 20 Tablet, Refills: 0  Start date: 8/3/2022      methocarbamoL (ROBAXIN) 750 mg tablet Take 1 Tablet by mouth four (4) times daily as needed for Muscle Spasm(s). Qty: 20 Tablet, Refills: 0  Start date: 8/3/2022      lidocaine 4 % patch 1 Patch by TransDERmal route every twelve (12) hours. Qty: 6 Patch, Refills: 0  Start date: 8/3/2022           2. Follow-up Information       Follow up With Specialties Details Why Contact Info    Huber Michelle MD Family Medicine  As needed [address] [facility] [address] [phone]      [facility] EMERGENCY DEPT Emergency Medicine  If symptoms worsen [address]  [address] [facility]  [phone]          3. Return to ED if worse     Diagnosis     Clinical Impression:   1. Strain of thoracic back region    2. Osteopenia of lumbar spine    3. Osteoarthritis of lumbar spine, unspecified spinal osteoarthritis complication status        Attestations:    Gurpreet Mcbride MD        Please note that this dictation was completed with [employer], the computer voice recognition software. Quite often unanticipated grammatical, syntax, homophones, and other interpretive errors are inadvertently transcribed by the computer software. Please disregard these errors. Please excuse any errors that have escaped final proofreading. Thank you.

## 2022-08-03 NOTE — ED NOTES
Per pt POA, she has arranged transport for pt with the [facility] provided by facility. Pt is resting quietly, vital signs stable. No acute distress noted. POA at bedside.

## 2022-08-03 NOTE — ED NOTES
Assisted to [facility], ambulatory with standby assist. [facility] staff present to transport pt, assumed care upon discharge. DC papers reviewed with POA and in hand.

## 2023-02-08 ENCOUNTER — APPOINTMENT (OUTPATIENT)
Dept: CT IMAGING | Age: 78
End: 2023-02-08
Attending: STUDENT IN AN ORGANIZED HEALTH CARE EDUCATION/TRAINING PROGRAM
Payer: MEDICARE

## 2023-02-08 ENCOUNTER — HOSPITAL ENCOUNTER (EMERGENCY)
Age: 78
Discharge: HOME | End: 2023-02-08
Attending: STUDENT IN AN ORGANIZED HEALTH CARE EDUCATION/TRAINING PROGRAM
Payer: MEDICARE

## 2023-02-08 ENCOUNTER — APPOINTMENT (OUTPATIENT)
Dept: GENERAL RADIOLOGY | Age: 78
End: 2023-02-08
Attending: STUDENT IN AN ORGANIZED HEALTH CARE EDUCATION/TRAINING PROGRAM
Payer: MEDICARE

## 2023-02-08 VITALS
WEIGHT: 183.2 LBS | DIASTOLIC BLOOD PRESSURE: 92 MMHG | SYSTOLIC BLOOD PRESSURE: 172 MMHG | OXYGEN SATURATION: 94 % | BODY MASS INDEX: 31.28 KG/M2 | HEIGHT: 64 IN | TEMPERATURE: 100.6 F | HEART RATE: 75 BPM | RESPIRATION RATE: 20 BRPM

## 2023-02-08 DIAGNOSIS — J10.1 INFLUENZA A: Primary | ICD-10-CM

## 2023-02-08 DIAGNOSIS — S09.90XA INJURY OF HEAD, INITIAL ENCOUNTER: ICD-10-CM

## 2023-02-08 LAB
ALBUMIN SERPL-MCNC: 3.4 G/DL (ref 3.5–5)
ALBUMIN/GLOB SERPL: 0.8 (ref 1.1–2.2)
ALP SERPL-CCNC: 97 U/L (ref 45–117)
ALT SERPL-CCNC: 29 U/L (ref 12–78)
ANION GAP SERPL CALC-SCNC: 5 MMOL/L (ref 5–15)
APPEARANCE UR: CLEAR
AST SERPL-CCNC: 27 U/L (ref 15–37)
BACTERIA URNS QL MICRO: NEGATIVE /HPF
BASOPHILS # BLD: 0 K/UL (ref 0–0.1)
BASOPHILS NFR BLD: 1 % (ref 0–1)
BILIRUB SERPL-MCNC: 0.2 MG/DL (ref 0.2–1)
BILIRUB UR QL: NEGATIVE
BUN SERPL-MCNC: 27 MG/DL (ref 6–20)
BUN/CREAT SERPL: 16 (ref 12–20)
CALCIUM SERPL-MCNC: 8.7 MG/DL (ref 8.5–10.1)
CHLORIDE SERPL-SCNC: 101 MMOL/L (ref 97–108)
CO2 SERPL-SCNC: 27 MMOL/L (ref 21–32)
COLOR UR: ABNORMAL
CREAT SERPL-MCNC: 1.67 MG/DL (ref 0.55–1.02)
DIFFERENTIAL METHOD BLD: ABNORMAL
EOSINOPHIL # BLD: 0 K/UL (ref 0–0.4)
EOSINOPHIL NFR BLD: 1 % (ref 0–7)
EPITH CASTS URNS QL MICRO: ABNORMAL /LPF
ERYTHROCYTE [DISTWIDTH] IN BLOOD BY AUTOMATED COUNT: 12.7 % (ref 11.5–14.5)
FLUAV AG NPH QL IA: POSITIVE
FLUBV AG NOSE QL IA: NEGATIVE
GLOBULIN SER CALC-MCNC: 4.1 G/DL (ref 2–4)
GLUCOSE BLD-MCNC: 122 MG/DL (ref 65–100)
GLUCOSE SERPL-MCNC: 115 MG/DL (ref 65–100)
GLUCOSE UR STRIP.AUTO-MCNC: NEGATIVE MG/DL
HCT VFR BLD AUTO: 38 % (ref 35–47)
HGB BLD-MCNC: 11.9 G/DL (ref 11.5–16)
HGB UR QL STRIP: ABNORMAL
HYALINE CASTS URNS QL MICRO: ABNORMAL /LPF (ref 0–2)
IMM GRANULOCYTES # BLD AUTO: 0 K/UL (ref 0–0.04)
IMM GRANULOCYTES NFR BLD AUTO: 1 % (ref 0–0.5)
KETONES UR QL STRIP.AUTO: NEGATIVE MG/DL
LEUKOCYTE ESTERASE UR QL STRIP.AUTO: NEGATIVE
LYMPHOCYTES # BLD: 0.6 K/UL (ref 0.8–3.5)
LYMPHOCYTES NFR BLD: 13 % (ref 12–49)
MCH RBC QN AUTO: 29.7 PG (ref 26–34)
MCHC RBC AUTO-ENTMCNC: 31.3 G/DL (ref 30–36.5)
MCV RBC AUTO: 94.8 FL (ref 80–99)
MONOCYTES # BLD: 1 K/UL (ref 0–1)
MONOCYTES NFR BLD: 22 % (ref 5–13)
NEUTS SEG # BLD: 2.9 K/UL (ref 1.8–8)
NEUTS SEG NFR BLD: 62 % (ref 32–75)
NITRITE UR QL STRIP.AUTO: NEGATIVE
NRBC # BLD: 0 K/UL (ref 0–0.01)
NRBC BLD-RTO: 0 PER 100 WBC
PH UR STRIP: 6.5 (ref 5–8)
PLATELET # BLD AUTO: 207 K/UL (ref 150–400)
PMV BLD AUTO: 10.2 FL (ref 8.9–12.9)
POTASSIUM SERPL-SCNC: 3.8 MMOL/L (ref 3.5–5.1)
PROT SERPL-MCNC: 7.5 G/DL (ref 6.4–8.2)
PROT UR STRIP-MCNC: 30 MG/DL
RBC # BLD AUTO: 4.01 M/UL (ref 3.8–5.2)
RBC #/AREA URNS HPF: ABNORMAL /HPF (ref 0–5)
RBC MORPH BLD: ABNORMAL
SARS-COV-2 RDRP RESP QL NAA+PROBE: NOT DETECTED
SERVICE CMNT-IMP: ABNORMAL
SODIUM SERPL-SCNC: 133 MMOL/L (ref 136–145)
SOURCE, COVRS: NORMAL
SP GR UR REFRACTOMETRY: 1.02
TROPONIN I SERPL HS-MCNC: 12 NG/L (ref 0–51)
UA: UC IF INDICATED,UAUC: ABNORMAL
UROBILINOGEN UR QL STRIP.AUTO: 0.2 EU/DL (ref 0.2–1)
WBC # BLD AUTO: 4.5 K/UL (ref 3.6–11)
WBC URNS QL MICRO: ABNORMAL /HPF (ref 0–4)

## 2023-02-08 PROCEDURE — 84484 ASSAY OF TROPONIN QUANT: CPT

## 2023-02-08 PROCEDURE — 70450 CT HEAD/BRAIN W/O DYE: CPT

## 2023-02-08 PROCEDURE — 72125 CT NECK SPINE W/O DYE: CPT

## 2023-02-08 PROCEDURE — 87635 SARS-COV-2 COVID-19 AMP PRB: CPT

## 2023-02-08 PROCEDURE — 36415 COLL VENOUS BLD VENIPUNCTURE: CPT

## 2023-02-08 PROCEDURE — 71045 X-RAY EXAM CHEST 1 VIEW: CPT

## 2023-02-08 PROCEDURE — 87804 INFLUENZA ASSAY W/OPTIC: CPT

## 2023-02-08 PROCEDURE — 81001 URINALYSIS AUTO W/SCOPE: CPT

## 2023-02-08 PROCEDURE — 80053 COMPREHEN METABOLIC PANEL: CPT

## 2023-02-08 PROCEDURE — 99285 EMERGENCY DEPT VISIT HI MDM: CPT

## 2023-02-08 PROCEDURE — 93005 ELECTROCARDIOGRAM TRACING: CPT

## 2023-02-08 PROCEDURE — 83605 ASSAY OF LACTIC ACID: CPT

## 2023-02-08 PROCEDURE — 85025 COMPLETE CBC W/AUTO DIFF WBC: CPT

## 2023-02-08 PROCEDURE — 74011250637 HC RX REV CODE- 250/637: Performed by: STUDENT IN AN ORGANIZED HEALTH CARE EDUCATION/TRAINING PROGRAM

## 2023-02-08 PROCEDURE — 82962 GLUCOSE BLOOD TEST: CPT

## 2023-02-08 RX ORDER — ACETAMINOPHEN 325 MG/1
650 TABLET ORAL
Qty: 20 TABLET | Refills: 0 | Status: SHIPPED | OUTPATIENT
Start: 2023-02-08

## 2023-02-08 RX ORDER — SODIUM CHLORIDE 0.9 % (FLUSH) 0.9 %
5-10 SYRINGE (ML) INJECTION AS NEEDED
Status: DISCONTINUED | OUTPATIENT
Start: 2023-02-08 | End: 2023-02-09 | Stop reason: HOSPADM

## 2023-02-08 RX ORDER — ACETAMINOPHEN 500 MG
1000 TABLET ORAL ONCE
Status: COMPLETED | OUTPATIENT
Start: 2023-02-08 | End: 2023-02-08

## 2023-02-08 RX ADMIN — ACETAMINOPHEN 1000 MG: 500 TABLET ORAL at 20:48

## 2023-02-09 LAB — LACTATE BLD-SCNC: 0.88 MMOL/L (ref 0.4–2)

## 2023-02-09 NOTE — ED NOTES
Verbal report called and given to [place]u [place] at [facility], patient discharged in stable condition w her POA.

## 2023-02-09 NOTE — ED PROVIDER NOTES
EMERGENCY DEPARTMENT HISTORY AND PHYSICAL EXAM      Date: 2/8/2023  Patient Name: Davida Montes    History of Presenting Illness     Chief Complaint   Patient presents with    Fall     Patient arrives from the memory care unit at the [facility], s/p a GLF.         HPI: History From: Staff at facility, EMS and patient, History limited by: History of cognitive limitation  Davida Montes, 65 y.o. female presents to the ED with cc of fall. She has a history of schizophrenia, and has baseline cognitive limitation per EMS and her living facility. She had a witnessed ground-level fall today when she lost her balance and fell backward and hit her head against a door. She does not take any blood thinners. No loss of consciousness. She has been mentating at her baseline per facility staff that I speak with over the phone. She has baseline dysarthria due to a dental device and sores in her mouth that are currently being treated. Staff at the facility seemed that she was having more trouble than usual walking over the past 2 days, her gait has been a bit more shuffling. They have not noted any other changes, no focal weakness or numbness, no change in facial asymmetry, no change in her speech. She reports some pain in the back of her head, otherwise denies any other pain. She denies coughing, congestion, abdominal pain, vomiting or diarrhea. She denies chest pain or shortness of breath. There are no other complaints, changes, or physical findings at this time. PCP: Osvaldo Cao MD    No current facility-administered medications on file prior to encounter. Current Outpatient Medications on File Prior to Encounter   Medication Sig Dispense Refill    ibuprofen (MOTRIN) 600 mg tablet Take 1 Tablet by mouth every six (6) hours as needed for Pain. 20 Tablet 0    methocarbamoL (ROBAXIN) 750 mg tablet Take 1 Tablet by mouth four (4) times daily as needed for Muscle Spasm(s).  20 Tablet 0    lidocaine 4 % patch 1 Patch by TransDERmal route every twelve (12) hours. 6 Patch 0    ondansetron (ZOFRAN ODT) 4 mg disintegrating tablet Take 1 Tab by mouth every eight (8) hours as needed for Nausea. 10 Tab 0    loratadine (CLARITIN) 10 mg tablet Take 10 mg by mouth daily as needed for Allergies. For congestion      OTHER DILCIA stockings - on in the morning & remove at bedtime to improve circulation as needed      HYDROcodone-acetaminophen (NORCO) 5-325 mg per tablet Take 1 Tab by mouth every four (4) hours as needed for Pain. Max Daily Amount: 6 Tabs. 25 Tab 0    mirabegron (MYRBETRIQ PO) Take 25 mg by mouth daily. cranberry 500 mg capsule Take 500 mg by mouth daily. perphenazine (TRILAFON) 8 mg tablet Take 16 mg by mouth. Take 2 tablets by mouth each night at bedtime 16 mg      CARBOXYMETHYLCELLULOS/GLYCERIN (OPTIVE OP) Apply  to eye three (3) times daily. perphenazine (,TRILAFON,TABLET) 4 mg tablet Take 2 Tabs by mouth three (3) times daily. Takes 2 in AM, 2 in PM, and 2 at HS (Patient taking differently: Take 16 mg by mouth nightly. 2 TABLETS) 180 Tab 6    FLUoxetine (PROzac) 20 mg capsule Take 60 mg by mouth daily. Take 3 at HS       amitriptyline (ELAVIL) 75 mg tablet Take  by mouth nightly. benztropine (COGENTIN) 0.5 mg tablet Take 1 mg by mouth two (2) times a day. Takes 1 mg TID          Past History     Past Medical History:  Past Medical History:   Diagnosis Date    Anemia     Arthritis     Asthma     Cancer ([address]     skin    Conjunctivitis     Depression     Edema     History of Bell's palsy 3/30/2010    Obesity, unspecified     paranoid schizophrenia     Parkinson's disease     Psychiatric disorder     schizoaffective    urinary incontinence     urinary incontinence    Varicose veins        Past Surgical History:  Past Surgical History:   Procedure Laterality Date    HX DILATION AND CURETTAGE      HX OTHER SURGICAL  12/06/2018    BIOPSY ON RT. THIGH / DR. EVERARDO Can History:  Family History   Problem Relation Age of Onset    Breast Cancer Maternal Grandmother     Cancer Maternal Grandmother         breast       Social History:  Social History     Tobacco Use    Smoking status: Never    Smokeless tobacco: Never   Substance Use Topics    Alcohol use: No    Drug use: Yes     Types: Prescription       Allergies: Allergies   Allergen Reactions    Haldol [Haloperidol Lactate] Other (comments)     Did not like how it made  Her feel         Physical Exam   Physical Exam  Constitutional:       General: She is not in acute distress. Appearance: She is not toxic-appearing. HENT:      Head: Normocephalic. Comments: Mild swelling in the occipital region with mild tenderness to palpation. Eyes:      Extraocular Movements: Extraocular movements intact. Pupils: Pupils are equal, round, and reactive to light. Cardiovascular:      Rate and Rhythm: Normal rate and regular rhythm. Pulmonary:      Effort: Pulmonary effort is normal.      Breath sounds: Normal breath sounds. Abdominal:      Palpations: Abdomen is soft. Tenderness: There is no abdominal tenderness. Musculoskeletal:      Cervical back: No tenderness. Comments: No swelling, tenderness or deformity of the extremities   Skin:     General: Skin is warm and dry. Neurological:      General: No focal deficit present. Mental Status: She is alert. Comments: Oriented to person and place, symmetric facial expressions, speech is dysarthric but understandable, baseline per her facility. Normal straight arm raise bilaterally, 5 out of 5 strength with ankle flexion and extension bilaterally. Sensation to light touch intact and symmetrical for upper and lower extremities bilaterally.    Psychiatric:         Mood and Affect: Mood normal.       Diagnostic Study Results     Labs -     Recent Results (from the past 24 hour(s))   CBC WITH AUTOMATED DIFF    Collection Time: 02/08/23  7:24 PM   Result Value Ref Range    WBC 4.5 3.6 - 11.0 K/uL    RBC 4.01 3.80 - 5.20 M/uL    HGB 11.9 11.5 - 16.0 g/dL    HCT 38.0 35.0 - 47.0 %    MCV 94.8 80.0 - 99.0 FL    MCH 29.7 26.0 - 34.0 PG    MCHC 31.3 30.0 - 36.5 g/dL    RDW 12.7 11.5 - 14.5 %    PLATELET 067 304 - 121 K/uL    MPV 10.2 8.9 - 12.9 FL    NRBC 0.0 0  WBC    ABSOLUTE NRBC 0.00 0.00 - 0.01 K/uL    NEUTROPHILS 62 32 - 75 %    LYMPHOCYTES 13 12 - 49 %    MONOCYTES 22 (H) 5 - 13 %    EOSINOPHILS 1 0 - 7 %    BASOPHILS 1 0 - 1 %    IMMATURE GRANULOCYTES 1 (H) 0.0 - 0.5 %    ABS. NEUTROPHILS 2.9 1.8 - 8.0 K/UL    ABS. LYMPHOCYTES 0.6 (L) 0.8 - 3.5 K/UL    ABS. MONOCYTES 1.0 0.0 - 1.0 K/UL    ABS. EOSINOPHILS 0.0 0.0 - 0.4 K/UL    ABS. BASOPHILS 0.0 0.0 - 0.1 K/UL    ABS. IMM. GRANS. 0.0 0.00 - 0.04 K/UL    DF SMEAR SCANNED      RBC COMMENTS NORMOCYTIC, NORMOCHROMIC     METABOLIC PANEL, COMPREHENSIVE    Collection Time: 02/08/23  7:24 PM   Result Value Ref Range    Sodium 133 (L) 136 - 145 mmol/L    Potassium 3.8 3.5 - 5.1 mmol/L    Chloride 101 97 - 108 mmol/L    CO2 27 21 - 32 mmol/L    Anion gap 5 5 - 15 mmol/L    Glucose 115 (H) 65 - 100 mg/dL    BUN 27 (H) 6 - 20 MG/DL    Creatinine 1.67 (H) 0.55 - 1.02 MG/DL    BUN/Creatinine ratio 16 12 - 20      eGFR 31 (L) >60 ml/min/1.73m2    Calcium 8.7 8.5 - 10.1 MG/DL    Bilirubin, total 0.2 0.2 - 1.0 MG/DL    ALT (SGPT) 29 12 - 78 U/L    AST (SGOT) 27 15 - 37 U/L    Alk.  phosphatase 97 45 - 117 U/L    Protein, total 7.5 6.4 - 8.2 g/dL    Albumin 3.4 (L) 3.5 - 5.0 g/dL    Globulin 4.1 (H) 2.0 - 4.0 g/dL    A-G Ratio 0.8 (L) 1.1 - 2.2     TROPONIN-HIGH SENSITIVITY    Collection Time: 02/08/23  7:24 PM   Result Value Ref Range    Troponin-High Sensitivity 12 0 - 51 ng/L   COVID-19 RAPID TEST    Collection Time: 02/08/23  7:24 PM   Result Value Ref Range    Specimen source Nasopharyngeal      COVID-19 rapid test Not detected NOTD     INFLUENZA A+B VIRAL AGS    Collection Time: 02/08/23  7:24 PM   Result Value Ref Range Influenza A Antigen Positive (A) NEG      Influenza B Antigen Negative NEG     EKG, 12 LEAD, INITIAL    Collection Time: 02/08/23  7:27 PM   Result Value Ref Range    Ventricular Rate 72 BPM    Atrial Rate 72 BPM    P-R Interval 184 ms    QRS Duration 86 ms    Q-T Interval 394 ms    QTC Calculation (Bezet) 431 ms    Calculated P Axis 63 degrees    Calculated R Axis -61 degrees    Calculated T Axis 16 degrees    Diagnosis       Normal sinus rhythm  Left anterior fascicular block  Cannot rule out Anterior infarct (cited on or before 04-MAY-2022)  When compared with ECG of 04-MAY-2022 13:24,  Nonspecific T wave abnormality no longer evident in Anterior leads     GLUCOSE, POC    Collection Time: 02/08/23  7:29 PM   Result Value Ref Range    Glucose (POC) 122 (H) 65 - 100 mg/dL    Performed by Radha JAQUEZ)    URINALYSIS W/ REFLEX CULTURE    Collection Time: 02/08/23  9:26 PM    Specimen: Urine   Result Value Ref Range    Color YELLOW/STRAW      Appearance CLEAR CLEAR      Specific gravity 1.016      pH (UA) 6.5 5.0 - 8.0      Protein 30 (A) NEG mg/dL    Glucose Negative NEG mg/dL    Ketone Negative NEG mg/dL    Bilirubin Negative NEG      Blood TRACE (A) NEG      Urobilinogen 0.2 0.2 - 1.0 EU/dL    Nitrites Negative NEG      Leukocyte Esterase Negative NEG      UA:UC IF INDICATED CULTURE NOT INDICATED BY UA RESULT CNI      WBC 0-4 0 - 4 /hpf    RBC 0-5 0 - 5 /hpf    Epithelial cells MODERATE (A) FEW /lpf    Bacteria Negative NEG /hpf    Hyaline cast 0-2 0 - 2 /lpf       Radiologic Studies -   CT HEAD WO CONT   Final Result   No evidence of acute process. CT SPINE CERV WO CONT   Final Result   Likely chronic loss vertebral body height of C4 and C5. Multilevel degenerative change. .          XR CHEST PORT   Final Result      No acute process on portable chest.           CT Results  (Last 48 hours)                 02/08/23 2106  CT HEAD WO CONT Final result    Impression:  No evidence of acute process. Narrative:  EXAM: CT HEAD WO CONT       INDICATION: fall, head trauma       COMPARISON: [id number]. CONTRAST: None. TECHNIQUE: Unenhanced CT of the head was performed using 5 mm images. Brain and   bone windows were generated. Coronal and sagittal reformats. CT dose reduction   was achieved through use of a standardized protocol tailored for this   examination and automatic exposure control for dose modulation. FINDINGS:   There is mild atrophy and compensatory dilatation of the ventricles. There is   extensive white matter disease likely to chronic small vessel ischemic disease. There is a small lacunar infarct in the left basal ganglia. . There is no   intracranial hemorrhage, extra-axial collection, or mass effect. The basilar   cisterns are open. No CT evidence of acute infarct. The bone windows demonstrate no abnormalities. The visualized portions of the   paranasal sinuses and mastoid air cells are clear. There is incidental calcified   nodule in the right occiput. 02/08/23 2106  CT SPINE CERV WO CONT Final result    Impression:  Likely chronic loss vertebral body height of C4 and C5. Multilevel degenerative change. .           Narrative:  EXAM: CT SPINE CERV WO CONT   CLINICAL HISTORY: fall   INDICATION: fall   COMPARISON:  None   TECHNIQUE:  Axial neck CT was performed. Noncontrast imaging obtained. Coronal   and sagittal reconstructions were performed. CT dose reduction was achieved through use of a standardized protocol tailored   for this examination and automatic exposure control for dose modulation. Osseous/bone algorithm was utilized. FINDINGS:   The vertebral bodies are anatomically aligned. There is no evidence of fracture   or subluxation. The prevertebral soft tissues are grossly within normal limits. The atlantodental interval is within normal limits. The craniocervical junction   is intact.    Likely chronic loss vertebral body height at C4 and C5. Multilevel canal and   foraminal stenoses of the cervical spine. CXR Results  (Last 48 hours)                 02/08/23 1943  XR CHEST PORT Final result    Impression:      No acute process on portable chest.           Narrative:  EXAM:  XR CHEST PORT       INDICATION: Evaluate for infiltrate       COMPARISON: 8/3/2022       TECHNIQUE: portable chest AP view       FINDINGS: The heart is mildly enlarged, but unchanged. The pulmonary vasculature   is within normal limits. Linear scarring is seen in the right base. The lungs and pleural spaces are   clear. The visualized bones and upper abdomen are age-appropriate. Medical Decision Making   I am the first provider for this patient. I reviewed the vital signs, available nursing notes, past medical history, past surgical history, family history and social history. Vital Signs-Reviewed the patient's vital signs. Patient Vitals for the past 24 hrs:   Temp Pulse Resp BP SpO2   02/08/23 2158 -- -- -- (!) 172/92 94 %   02/08/23 1932 -- -- -- (!) 151/82 --   02/08/23 1915 (!) 100.6 °F (38.1 °C) 75 20 (!) 171/91 94 %         Provider Notes (Medical Decision Making):   76-year-old female presenting with headache after ground-level fall. She is noted to be febrile here. Staff at her facility also noted that she was having a more shuffling gait over the past 2 days. No other new neurologic findings, and her neuro exam is otherwise at baseline per history obtained from staff over the phone, her gait is not ataxic here, unlikely CVA. Differential includes closed head injury, viral syndrome, influenza, COVID-19, UTI, pneumonia, concussion, electrolyte or metabolic abnormalities. She is otherwise nontoxic-appearing, unlikely sepsis. ED Course:     Initial assessment performed. The patients presenting problems have been discussed, and they are in agreement with the care plan formulated and outlined with them.   I have encouraged them to ask questions as they arise throughout their visit. Medications   sodium chloride (NS) flush 5-10 mL (has no administration in time range)   acetaminophen (TYLENOL) tablet 1,000 mg (1,000 mg Oral Given 2/8/23 2048)        ED Course as of 02/08/23 2324 Wed Feb 08, 2023 2024 Chest x-ray is independently interpreted by myself as no acute infiltrate. [CM]   2024 EKG was performed at 19: 27, independently interpreted by myself as normal sinus rhythm, not concerning for ACS. [CM]      ED Course User Index  [CM] Bruna Murray MD        I reviewed her general surgery office visit from 12/18/2018, had excision of SCC at the time. CBC negative for leukocytosis or anemia, basic metabolic panel elevated creatinine, not significantly changed fromRecent baseline, otherwise no worrisome electrolyte abnormalities. Influenza A is positive. Her power of  is now at bedside and helps provide history, confirms that she is at her neurologic baseline. The patient on reevaluation denies any significant complaints, is able to ambulate. Informed of her influenza status. Reviewed CT imaging as above. Patient is counseled on supportive care and return precautions. Will return to the ED for any worsening pain, weakness or numbness, changes in mentation, or any new or worrisome symptoms. Will followup with primary care doctor within 2 days. Critical Care Time:         Disposition:  Home  Yuri Adam's  results have been reviewed with her. She has been counseled regarding her diagnosis, treatment, and plan. She verbally conveys understanding and agreement of the signs, symptoms, diagnosis, treatment and prognosis and additionally agrees to follow up as discussed. She also agrees with the care-plan and conveys that all of her questions have been answered.   I have also provided discharge instructions for her that include: educational information regarding their diagnosis and treatment, and list of reasons why they would want to return to the ED prior to their follow-up appointment, should her condition change. PLAN:  1. Discharge Medication List as of 2/8/2023  9:53 PM        START taking these medications    Details   acetaminophen (TYLENOL) 325 mg tablet Take 2 Tablets by mouth every four (4) hours as needed for Pain or Fever., Normal, Disp-20 Tablet, R-0           CONTINUE these medications which have NOT CHANGED    Details   ibuprofen (MOTRIN) 600 mg tablet Take 1 Tablet by mouth every six (6) hours as needed for Pain., Normal, Disp-20 Tablet, R-0      methocarbamoL (ROBAXIN) 750 mg tablet Take 1 Tablet by mouth four (4) times daily as needed for Muscle Spasm(s). , Normal, Disp-20 Tablet, R-0      lidocaine 4 % patch 1 Patch by TransDERmal route every twelve (12) hours. , Normal, Disp-6 Patch, R-0      ondansetron (ZOFRAN ODT) 4 mg disintegrating tablet Take 1 Tab by mouth every eight (8) hours as needed for Nausea. , Print, Disp-10 Tab, R-0      loratadine (CLARITIN) 10 mg tablet Take 10 mg by mouth daily as needed for Allergies. For congestion, Historical Med      OTHER DILCIA stockings - on in the morning & remove at bedtime to improve circulation as needed, Historical Med      HYDROcodone-acetaminophen (NORCO) 5-325 mg per tablet Take 1 Tab by mouth every four (4) hours as needed for Pain. Max Daily Amount: 6 Tabs., Print, Disp-25 Tab, R-0      mirabegron (MYRBETRIQ PO) Take 25 mg by mouth daily. , Historical Med      cranberry 500 mg capsule Take 500 mg by mouth daily. , Historical Med      !! perphenazine (TRILAFON) 8 mg tablet Take 16 mg by mouth.  Take 2 tablets by mouth each night at bedtime 16 mg, Historical Med      CARBOXYMETHYLCELLULOS/GLYCERIN (OPTIVE OP) Ophthalmic, 3 TIMES DAILY Starting 9/16/2010, Until Discontinued, Historical Med      !! perphenazine (,TRILAFON,TABLET) 4 mg tablet Takes 2 in AM, 2 in PM, and 2 at HS8 mg, Oral, 3 TIMES DAILY Starting 11/18/2009, Until Discontinued, Disp-180 Tab, R-6, Normal      FLUoxetine (PROzac) 20 mg capsule Take 60 mg by mouth daily. Take 3 at HS , Historical Med      amitriptyline (ELAVIL) 75 mg tablet Oral, EVERY BEDTIME, Until Discontinued, Historical Med      benztropine (COGENTIN) 0.5 mg tablet Take 1 mg by mouth two (2) times a day. Takes 1 mg TID , Historical Med       !! - Potential duplicate medications found. Please discuss with provider.         2.   Follow-up Information       Follow up With Specialties Details Why Contact Info    Enio Stuart MD Family Medicine Schedule an appointment as soon as possible for a visit in 2 days  [facility] [address]. [phone]      [facility] EMERGENCY DEPT Emergency Medicine  As needed, If symptoms worsen [address] [place] [address]  [address] [facility]  [phone]          Return to ED if worse     Diagnosis     Clinical Impression: Acute head injury, acute influenza

## 2023-02-10 LAB
ATRIAL RATE: 72 BPM
CALCULATED P AXIS, ECG09: 63 DEGREES
CALCULATED R AXIS, ECG10: -61 DEGREES
CALCULATED T AXIS, ECG11: 16 DEGREES
DIAGNOSIS, 93000: NORMAL
P-R INTERVAL, ECG05: 184 MS
Q-T INTERVAL, ECG07: 394 MS
QRS DURATION, ECG06: 86 MS
QTC CALCULATION (BEZET), ECG08: 431 MS
VENTRICULAR RATE, ECG03: 72 BPM

## 2023-09-29 ENCOUNTER — HOSPITAL ENCOUNTER (EMERGENCY)
Facility: HOSPITAL | Age: 78
Discharge: HOME | End: 2023-09-29
Attending: STUDENT IN AN ORGANIZED HEALTH CARE EDUCATION/TRAINING PROGRAM
Payer: MEDICARE

## 2023-09-29 VITALS
BODY MASS INDEX: 31.45 KG/M2 | OXYGEN SATURATION: 96 % | DIASTOLIC BLOOD PRESSURE: 89 MMHG | HEART RATE: 62 BPM | SYSTOLIC BLOOD PRESSURE: 163 MMHG | RESPIRATION RATE: 18 BRPM | HEIGHT: 64 IN | TEMPERATURE: 98.7 F

## 2023-09-29 DIAGNOSIS — N18.9 CHRONIC KIDNEY DISEASE, UNSPECIFIED CKD STAGE: ICD-10-CM

## 2023-09-29 DIAGNOSIS — F03.93 DEMENTIA WITH MOOD DISTURBANCE, UNSPECIFIED DEMENTIA SEVERITY, UNSPECIFIED DEMENTIA TYPE (HCC): ICD-10-CM

## 2023-09-29 DIAGNOSIS — R46.89 BEHAVIORAL CHANGE: Primary | ICD-10-CM

## 2023-09-29 LAB
ALBUMIN SERPL-MCNC: 3.6 G/DL (ref 3.5–5)
ALBUMIN/GLOB SERPL: 0.8 (ref 1.1–2.2)
ALP SERPL-CCNC: 108 U/L (ref 45–117)
ALT SERPL-CCNC: 23 U/L (ref 12–78)
ANION GAP SERPL CALC-SCNC: 2 MMOL/L (ref 5–15)
APPEARANCE UR: CLEAR
AST SERPL-CCNC: 17 U/L (ref 15–37)
BACTERIA URNS QL MICRO: NEGATIVE /HPF
BASOPHILS # BLD: 0.1 K/UL (ref 0–0.1)
BASOPHILS NFR BLD: 1 % (ref 0–1)
BILIRUB SERPL-MCNC: 0.3 MG/DL (ref 0.2–1)
BILIRUB UR QL: NEGATIVE
BUN SERPL-MCNC: 37 MG/DL (ref 6–20)
BUN/CREAT SERPL: 24 (ref 12–20)
CALCIUM SERPL-MCNC: 9.4 MG/DL (ref 8.5–10.1)
CHLORIDE SERPL-SCNC: 105 MMOL/L (ref 97–108)
CO2 SERPL-SCNC: 29 MMOL/L (ref 21–32)
COLOR UR: NORMAL
CREAT SERPL-MCNC: 1.53 MG/DL (ref 0.55–1.02)
DIFFERENTIAL METHOD BLD: NORMAL
EOSINOPHIL # BLD: 0.4 K/UL (ref 0–0.4)
EOSINOPHIL NFR BLD: 5 % (ref 0–7)
EPITH CASTS URNS QL MICRO: NORMAL /LPF
ERYTHROCYTE [DISTWIDTH] IN BLOOD BY AUTOMATED COUNT: 12.5 % (ref 11.5–14.5)
GLOBULIN SER CALC-MCNC: 4.8 G/DL (ref 2–4)
GLUCOSE SERPL-MCNC: 112 MG/DL (ref 65–100)
GLUCOSE UR STRIP.AUTO-MCNC: NEGATIVE MG/DL
HCT VFR BLD AUTO: 39.3 % (ref 35–47)
HGB BLD-MCNC: 12.2 G/DL (ref 11.5–16)
HGB UR QL STRIP: NEGATIVE
HYALINE CASTS URNS QL MICRO: NORMAL /LPF (ref 0–5)
IMM GRANULOCYTES # BLD AUTO: 0 K/UL (ref 0–0.04)
IMM GRANULOCYTES NFR BLD AUTO: 0 % (ref 0–0.5)
KETONES UR QL STRIP.AUTO: NEGATIVE MG/DL
LEUKOCYTE ESTERASE UR QL STRIP.AUTO: NEGATIVE
LYMPHOCYTES # BLD: 1.7 K/UL (ref 0.8–3.5)
LYMPHOCYTES NFR BLD: 22 % (ref 12–49)
MCH RBC QN AUTO: 29.6 PG (ref 26–34)
MCHC RBC AUTO-ENTMCNC: 31 G/DL (ref 30–36.5)
MCV RBC AUTO: 95.4 FL (ref 80–99)
MONOCYTES # BLD: 0.7 K/UL (ref 0–1)
MONOCYTES NFR BLD: 9 % (ref 5–13)
NEUTS SEG # BLD: 4.9 K/UL (ref 1.8–8)
NEUTS SEG NFR BLD: 63 % (ref 32–75)
NITRITE UR QL STRIP.AUTO: NEGATIVE
NRBC # BLD: 0 K/UL (ref 0–0.01)
NRBC BLD-RTO: 0 PER 100 WBC
PH UR STRIP: 5.5 (ref 5–8)
PLATELET # BLD AUTO: 258 K/UL (ref 150–400)
PMV BLD AUTO: 10.3 FL (ref 8.9–12.9)
POTASSIUM SERPL-SCNC: 4.1 MMOL/L (ref 3.5–5.1)
PROT SERPL-MCNC: 8.4 G/DL (ref 6.4–8.2)
PROT UR STRIP-MCNC: NEGATIVE MG/DL
RBC # BLD AUTO: 4.12 M/UL (ref 3.8–5.2)
RBC #/AREA URNS HPF: NORMAL /HPF (ref 0–5)
SODIUM SERPL-SCNC: 136 MMOL/L (ref 136–145)
SP GR UR REFRACTOMETRY: 1.01 (ref 1–1.03)
URINE CULTURE IF INDICATED: NORMAL
UROBILINOGEN UR QL STRIP.AUTO: 0.2 EU/DL (ref 0.2–1)
WBC # BLD AUTO: 7.8 K/UL (ref 3.6–11)
WBC URNS QL MICRO: NORMAL /HPF (ref 0–4)

## 2023-09-29 PROCEDURE — 85025 COMPLETE CBC W/AUTO DIFF WBC: CPT

## 2023-09-29 PROCEDURE — 99283 EMERGENCY DEPT VISIT LOW MDM: CPT

## 2023-09-29 PROCEDURE — 36415 COLL VENOUS BLD VENIPUNCTURE: CPT

## 2023-09-29 PROCEDURE — 90791 PSYCH DIAGNOSTIC EVALUATION: CPT

## 2023-09-29 PROCEDURE — 81001 URINALYSIS AUTO W/SCOPE: CPT

## 2023-09-29 PROCEDURE — 80053 COMPREHEN METABOLIC PANEL: CPT

## 2023-09-29 ASSESSMENT — PAIN - FUNCTIONAL ASSESSMENT
PAIN_FUNCTIONAL_ASSESSMENT: 0-10
PAIN_FUNCTIONAL_ASSESSMENT: NONE - DENIES PAIN

## 2023-09-29 ASSESSMENT — PAIN SCALES - GENERAL: PAINLEVEL_OUTOF10: 0

## 2023-09-29 NOTE — ED PROVIDER NOTES
[address] EMERGENCY DEP  EMERGENCY DEPARTMENT ENCOUNTER      Pt Name: Keren Beckham  MRN: [id number]  [address] 1945  Date of evaluation: 9/29/2023  Provider: Delmi Montoya DO    CHIEF COMPLAINT       Chief Complaint   Patient presents with    Suicidal         HISTORY OF PRESENT ILLNESS    HPI    Keren Beckham is a 65 y.o. female with a history of dementia, depression, schizoaffective disorder who presents to the emergency department for evaluation of reported suicidal thoughts. Patient reports she is here today because she \"wants attention\" and that she told staff at her nursing facility that she \"was going to stop eating\" and that the staff took it the wrong way. She is unable to confirm or deny if she said this in an attempt to want to end her life. She is oriented x 2 (knows month but not year). Denies any pain complaints. Nursing Notes were reviewed. REVIEW OF SYSTEMS       Review of Systems   Unable to perform ROS: Dementia           PAST MEDICAL HISTORY     Past Medical History:   Diagnosis Date    Anemia     Arthritis     Asthma     Cancer ([address])     skin    Conjunctivitis     Depression     Edema     History of Bell's palsy 3/30/2010    Obesity, unspecified     Other ill-defined conditions(799.89)     urinary incontinence    Parkinson's disease ([address])     Psychiatric disorder     schizoaffective    Unspecified schizophrenia, unspecified condition     Varicose veins          SURGICAL HISTORY       Past Surgical History:   Procedure Laterality Date    DILATION AND CURETTAGE OF UTERUS      OTHER SURGICAL HISTORY  12/06/2018    BIOPSY ON RT. THIGH / DR. LUC GRANT         CURRENT MEDICATIONS       Previous Medications    ACETAMINOPHEN (TYLENOL) 325 MG TABLET    Take 650 mg by mouth every 4 hours as needed    AMITRIPTYLINE (ELAVIL) 75 MG TABLET    Take by mouth    BENZTROPINE (COGENTIN) 0.5 MG TABLET    Take 1 mg by mouth 2 times daily    FLUOXETINE (PROZAC) 20 MG CAPSULE    Take 60 mg by

## 2023-09-29 NOTE — BSMART NOTE
BSMART assessment completed, and suicide risk level noted to be LOW. Primary Nurse NA and Charge Nurse Ramin Villanueva and Physician Bhupinder Lloyd MD notified. Concerns not observed. Security/Off- has not been notified.

## 2023-09-29 NOTE — ED NOTES
Pt and family aware of discharge and follow up instructions. Pt left via private vehicle with family. In no acute distress, VSS and  Geovani SI/HI at this time.       Bob Sauceda RN  09/29/23 9602

## 2023-09-29 NOTE — ED TRIAGE NOTES
Patient arrived via EMS from [facility] Dementia unit. She reported that she is suicidal today. Patient reports she told them she was going to stop eating and they took it the wrong way.

## 2023-09-29 NOTE — BSMART NOTE
Per triage, \"Patient arrived via EMS from [facility]. She reported that she is suicidal today. Patient reports she told them she was going to stop eating and they took it the wrong way. \"  Pt seen at bedside face to face. Pt presented as euthymic and cooperative. Pt presents as if she has dx of ID, moderate. Pt was A&Ox4. Pt was able to provide her name, place, month/date and day. Pt shared she was not suicidal. Pt denied HI and AVH. Pt when asked if she made threat to stop eating became a bit elevated and seemed confused but admitted she did bc she was angry. However, pt was unable to recall what she was angry about. Pt stated she just wanted to get \"out of this place\" with a huge smile on her face. Pt at this time does not present as danger to self. Pt's Bhupinder Mcnally stated when pt has mood changes it is usually related to a UTI. POA requested to have that checked and she stated she would like to take pt home/back to facility. POA felt pt was not suicidal or danger to self. POA reported pt/pt's family used to be member of her [employer] and she helped care for entire family. Received call from [facility] director, Ruslan LANDAVERDE/[phone] who reported that he was unsure exactly what happened but pt was threatening suicide. He went onto elaborate pt threatened to regurgitate food and choke on it as means to die or not eat. Pt continued to deny SI when asked in ER. Pt has Schizoaffective D/O and has managed outpatient for many years. POA stated pt has not had inpatient psychiatric admission in 20+ years. Director had Slim Recio NP online during call as well and she stated pt was exhibiting \"manipulative behaviors. \" Mr. Ton William shared they did not feel comfortable taking pt back to facility bc they will not have a nurse until Monday nor will pt have monitored care on locked dementia unit.  Writer asked if pt had access to any kind of ligature, sharps, medication, etc and he stated no but no supervision only check-ins on pt. Liset Webster NP made writer aware that Dr. Juanita Vela was pt's psychiatrist along with stating she felt pt's behavior needed to be stabilized on unit. Writer spoke with Dr. Lisa Reyes and relayed all information provided by Liset Webster NP and Director of facility to include POA/family friends and she stated pt did not meet admission criteria at this time. Recommendation is return to facility and follow-up with Dr. Juanita Vela Monday morning. POA agreed to follow-up on Monday with Dr. Juanita Vela and she will be checking in with pt over weekend. POA stated she has never known pt to make any suicidal threats previously and pt has no previous attempts. When writer asked pt again if she wanted to die by suicide she stated no and she added she did not want to eat that food they were giving her. Mr. Chen Mixon called back and advised pt would be returning home to facility.

## 2024-01-02 NOTE — ED NOTES
"Daily Note     Today's date: 2024  Patient name: Diego Dumont  : 1941  MRN: [id number]  Referring provider: Jamal Gamboa DO  Dx:   Encounter Diagnosis     ICD-10-CM    1. Primary osteoarthritis of right knee  M17.11       2. Status post total right knee replacement  Z96.651       3. S/P total knee arthroplasty, right  Z96.651                      Subjective: Patient reports that he has been trying to focus on doing more knee extension stretches throughout the day and is up to 3-4x per day. He says that he continues to experience a lot of stiffness in the knee when sitting for even 10-15 minutes.       Objective: See treatment diary below      Assessment: Tolerated treatment well. Progressed patient with addition of matrix knee extension and hamstring curls to facilitate improved right lower extremity strength. He was able to complete without pain or soreness, however was advised on proper technique to utilize both LEs evenly. Challenged with eccentric control for lateral step downs on 4\" step. Patient demonstrated fatigue post treatment, exhibited good technique with therapeutic exercises, and would benefit from continued PT    Plan: Continue per plan of care.  Progress treatment as tolerated.       Precautions: R TKA 23, HTN, Hx prostate CA  Access Code: [email or web address]  POC expires Unit limit Auth  expiration date PT/OT + Visit Limit?   24 N/A 24 BOMN  $20                 Visit/Unit Tracking  AUTH Status:  Date  1/2     Approved 12  Used 1 2 3 4 5 6 7 8 9 10      Remaining  11 10 9 8 7 6 5 4 3 2        Manuals  1/2   R knee PROM with OP   BE BE KB SC BE GR SC BE   Patellar mobility     BE KB   SC (PROM)  GR SC PROM  BE   Tubigrip size E   BE BE                                                Neuro Re-Ed             Quad sets towel under heel HEP reviewed 5\"x10  5\"x5 5\"2x10  5\" 2x10  5\" 3x " Contacted [facility] and requested a ride back home to [facility] via stretcher. Orquidea Estrada gave me a [facility] ETA charge nurse made aware. "10     DC   Quad set with LAQ HEP 5\"x10 5\" 2x10 with AAROM from L LE 5\" 2x10 with AAROM from L LE 5\" 2x10 AROM 5\" 2x 10 AROM  5\" 3x10    DC   Quad set with SLR      2x 10 2x10 2x10 3x 10 3\" hold     TKE with TB vs chary       NV  [place]  8# 20x5\" [place] 8# 30x5\" [place] 8# 30x5\"                                          Ther Ex             Rec bike rocking for knee ROM   NV Rocking 10' Rocking 10' Rocking  to full rotation Fwd/ bwd 10 min  Rocking to fwd rotations   10' Rocking  10 min Fwd 10 min no tension  Fwd 10 min no tension   Knee / prone lying off edge of table       2 min  no  OP Reviewed and seated with heel prop  3 min with overpressure      Supine heel props          2 min     Matrix knee ext          10# 2x10   Matrix hamstring curls           15# 2x10   LLLD knee ext stretch on Matrix           40# 3 min   Pt education PT POC, HEP, post op status  HEP review, ice, compression stockings     HEP                   Ther Activity             TG squats    L19 2x10  L19 2x10  L20 2x 10  L20 3x10 L20 3x10 L22 3x 10     Fwd step ups    4\" x10   VC 4\" x10   VC 4\" 2x10  6\" 2x10 6\" 3x10 6\" 3x 10  6\" 3x 10    Lat step downs         NV 2\" - 4\"  4\" 3x5                                          Gait Training             SPC ambulation       100ft with cues       Stair navigation       3x nonreciprocally       Modalities                                                              "

## 2024-06-22 ENCOUNTER — APPOINTMENT (OUTPATIENT)
Facility: HOSPITAL | Age: 79
End: 2024-06-22
Payer: MEDICARE

## 2024-06-22 ENCOUNTER — HOSPITAL ENCOUNTER (EMERGENCY)
Facility: HOSPITAL | Age: 79
Discharge: HOME | End: 2024-06-22
Attending: EMERGENCY MEDICINE
Payer: MEDICARE

## 2024-06-22 VITALS
RESPIRATION RATE: 20 BRPM | DIASTOLIC BLOOD PRESSURE: 94 MMHG | OXYGEN SATURATION: 95 % | TEMPERATURE: 98.5 F | BODY MASS INDEX: 33.8 KG/M2 | SYSTOLIC BLOOD PRESSURE: 187 MMHG | HEART RATE: 71 BPM | HEIGHT: 64 IN | WEIGHT: 197.97 LBS
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
## 2024-06-22 LAB
ALBUMIN SERPL-MCNC: 3.4 G/DL (ref 3.5–5)
ALBUMIN/GLOB SERPL: 0.8 (ref 1.1–2.2)
ALP SERPL-CCNC: 115 U/L (ref 45–117)
ALT SERPL-CCNC: 19 U/L (ref 12–78)
ANION GAP SERPL CALC-SCNC: 7 MMOL/L (ref 5–15)
AST SERPL-CCNC: 19 U/L (ref 15–37)
BASOPHILS # BLD: 0 K/UL (ref 0–0.1)
BASOPHILS NFR BLD: 0 % (ref 0–1)
BILIRUB SERPL-MCNC: 0.4 MG/DL (ref 0.2–1)
BUN SERPL-MCNC: 51 MG/DL (ref 6–20)
BUN/CREAT SERPL: 27 (ref 12–20)
CALCIUM SERPL-MCNC: 9.3 MG/DL (ref 8.5–10.1)
CHLORIDE SERPL-SCNC: 108 MMOL/L (ref 97–108)
CO2 SERPL-SCNC: 24 MMOL/L (ref 21–32)
CREAT SERPL-MCNC: 1.88 MG/DL (ref 0.55–1.02)
DIFFERENTIAL METHOD BLD: NORMAL
EKG ATRIAL RATE: 74 BPM
EKG DIAGNOSIS: NORMAL
EKG P AXIS: -4 DEGREES
EKG P-R INTERVAL: 158 MS
EKG Q-T INTERVAL: 450 MS
EKG QRS DURATION: 90 MS
EKG QTC CALCULATION (BAZETT): 499 MS
EKG R AXIS: -49 DEGREES
EKG T AXIS: 15 DEGREES
EKG VENTRICULAR RATE: 74 BPM
EOSINOPHIL # BLD: 0.3 K/UL (ref 0–0.4)
EOSINOPHIL NFR BLD: 3 % (ref 0–7)
ERYTHROCYTE [DISTWIDTH] IN BLOOD BY AUTOMATED COUNT: 12.3 % (ref 11.5–14.5)
GLOBULIN SER CALC-MCNC: 4.5 G/DL (ref 2–4)
GLUCOSE SERPL-MCNC: 103 MG/DL (ref 65–100)
HCT VFR BLD AUTO: 38.2 % (ref 35–47)
HGB BLD-MCNC: 12.3 G/DL (ref 11.5–16)
IMM GRANULOCYTES # BLD AUTO: 0 K/UL (ref 0–0.04)
IMM GRANULOCYTES NFR BLD AUTO: 0 % (ref 0–0.5)
LYMPHOCYTES # BLD: 2.4 K/UL (ref 0.8–3.5)
LYMPHOCYTES NFR BLD: 24 % (ref 12–49)
MCH RBC QN AUTO: 29.2 PG (ref 26–34)
MCHC RBC AUTO-ENTMCNC: 32.2 G/DL (ref 30–36.5)
MCV RBC AUTO: 90.7 FL (ref 80–99)
MONOCYTES # BLD: 0.8 K/UL (ref 0–1)
MONOCYTES NFR BLD: 8 % (ref 5–13)
NEUTS SEG # BLD: 6.3 K/UL (ref 1.8–8)
NEUTS SEG NFR BLD: 65 % (ref 32–75)
NRBC # BLD: 0 K/UL (ref 0–0.01)
NRBC BLD-RTO: 0 PER 100 WBC
PLATELET # BLD AUTO: 339 K/UL (ref 150–400)
PMV BLD AUTO: 10.3 FL (ref 8.9–12.9)
POTASSIUM SERPL-SCNC: 3.3 MMOL/L (ref 3.5–5.1)
PROT SERPL-MCNC: 7.9 G/DL (ref 6.4–8.2)
RBC # BLD AUTO: 4.21 M/UL (ref 3.8–5.2)
SODIUM SERPL-SCNC: 139 MMOL/L (ref 136–145)
TROPONIN I SERPL HS-MCNC: 11 NG/L (ref 0–51)
WBC # BLD AUTO: 9.9 K/UL (ref 3.6–11)

## 2024-06-22 PROCEDURE — 70450 CT HEAD/BRAIN W/O DYE: CPT

## 2024-06-22 PROCEDURE — 84484 ASSAY OF TROPONIN QUANT: CPT

## 2024-06-22 PROCEDURE — 96374 THER/PROPH/DIAG INJ IV PUSH: CPT

## 2024-06-22 PROCEDURE — 36415 COLL VENOUS BLD VENIPUNCTURE: CPT

## 2024-06-22 PROCEDURE — 80053 COMPREHEN METABOLIC PANEL: CPT

## 2024-06-22 PROCEDURE — 6360000002 HC RX W HCPCS: Performed by: EMERGENCY MEDICINE

## 2024-06-22 PROCEDURE — 85025 COMPLETE CBC W/AUTO DIFF WBC: CPT

## 2024-06-22 PROCEDURE — 99284 EMERGENCY DEPT VISIT MOD MDM: CPT

## 2024-06-22 RX ORDER — LORAZEPAM 2 MG/ML
1 INJECTION INTRAMUSCULAR ONCE
Status: COMPLETED | OUTPATIENT
Start: 2024-06-22 | End: 2024-06-22

## 2024-06-22 RX ADMIN — LORAZEPAM 1 MG: 2 INJECTION INTRAMUSCULAR; INTRAVENOUS at 02:30

## 2024-06-22 ASSESSMENT — LIFESTYLE VARIABLES
HOW OFTEN DO YOU HAVE A DRINK CONTAINING ALCOHOL: NEVER
HOW MANY STANDARD DRINKS CONTAINING ALCOHOL DO YOU HAVE ON A TYPICAL DAY: PATIENT DOES NOT DRINK

## 2024-06-22 ASSESSMENT — PAIN - FUNCTIONAL ASSESSMENT: PAIN_FUNCTIONAL_ASSESSMENT: NONE - DENIES PAIN

## 2024-06-22 NOTE — ED PROVIDER NOTES
[facility] EMERGENCY DEPT  EMERGENCY DEPARTMENT ENCOUNTER       Pt Name: Kapil Stone  MRN: [id number]  Birthdate 1945  Date of evaluation: 6/22/2024  Provider: José Hartmann MD   PCP: Rosemarie Box MD  Note Started: 1:05 AM EDT 6/22/24     CHIEF COMPLAINT       Chief Complaint   Patient presents with    Fall     Via EMS for a GLF. Unsure if she hit her head, denies any pain, and per EMS she is not on blood thinners. Pt is from a memory care unit at [facility]. EMS states [facility] is unsure if she fell but that they found her on the floor. Pt Aoxself at baseline and facial droop at baseline.         HISTORY OF PRESENT ILLNESS: 1 or more elements      History From: EMS, History limited by: Mental status     Kapil Stone is a 78 y.o. female with history of schizoaffective disorder, drug-induced Parkinson's, Bell's palsy who is normally A/O x 1 at baseline presents to the emergency department from skilled nursing home after being found down.    Reportedly patient was \"calling out\" to staff which is her baseline.  They gave patient ibuprofen, upon checking on her again, patient found on floor.  Unclear if fell.    Patient denied any complaints.  Arrived via EMS at baseline.     Please See MDM for Additional Details of the HPI/PMH  Nursing Notes were all reviewed and agreed with or any disagreements were addressed in the HPI.     REVIEW OF SYSTEMS        Positives and Pertinent negatives as per HPI.    PAST HISTORY     Past Medical History:  Past Medical History:   Diagnosis Date    Anemia     Arthritis     Asthma     Cancer (HCC)     skin    Conjunctivitis     Depression     Edema     History of Bell's palsy 3/30/2010    Obesity, unspecified     Other ill-defined conditions(799.89)     urinary incontinence    Parkinson's disease (HCC)     Psychiatric disorder     schizoaffective    Unspecified schizophrenia, unspecified condition     Varicose veins        Past Surgical History:  Past Surgical History:

## 2024-06-28 LAB
EKG ATRIAL RATE: 74 BPM
EKG DIAGNOSIS: NORMAL
EKG P AXIS: -4 DEGREES
EKG P-R INTERVAL: 158 MS
EKG Q-T INTERVAL: 450 MS
EKG QRS DURATION: 90 MS
EKG QTC CALCULATION (BAZETT): 499 MS
EKG R AXIS: -49 DEGREES
EKG T AXIS: 15 DEGREES
EKG VENTRICULAR RATE: 74 BPM

## 2024-10-26 ENCOUNTER — APPOINTMENT (OUTPATIENT)
Facility: HOSPITAL | Age: 79
End: 2024-10-26
Payer: MEDICARE

## 2024-10-26 ENCOUNTER — HOSPITAL ENCOUNTER (EMERGENCY)
Facility: HOSPITAL | Age: 79
Discharge: OTHER INSTITUTION | End: 2024-10-26
Attending: STUDENT IN AN ORGANIZED HEALTH CARE EDUCATION/TRAINING PROGRAM
Payer: MEDICARE

## 2024-10-26 VITALS
TEMPERATURE: 97.3 F | OXYGEN SATURATION: 96 % | SYSTOLIC BLOOD PRESSURE: 168 MMHG | DIASTOLIC BLOOD PRESSURE: 106 MMHG | HEART RATE: 59 BPM | RESPIRATION RATE: 17 BRPM

## 2024-10-26 DIAGNOSIS — T78.40XA ALLERGIC REACTION, INITIAL ENCOUNTER: Primary | ICD-10-CM

## 2024-10-26 LAB
ALBUMIN SERPL-MCNC: 3.2 G/DL (ref 3.5–5)
ALBUMIN/GLOB SERPL: 0.8 (ref 1.1–2.2)
ALP SERPL-CCNC: 101 U/L (ref 45–117)
ALT SERPL-CCNC: 16 U/L (ref 12–78)
ANION GAP SERPL CALC-SCNC: 3 MMOL/L (ref 2–12)
AST SERPL-CCNC: 16 U/L (ref 15–37)
BASOPHILS # BLD: 0 K/UL (ref 0–0.1)
BASOPHILS NFR BLD: 1 % (ref 0–1)
BILIRUB SERPL-MCNC: 0.4 MG/DL (ref 0.2–1)
BUN SERPL-MCNC: 30 MG/DL (ref 6–20)
BUN/CREAT SERPL: 23 (ref 12–20)
CALCIUM SERPL-MCNC: 8.7 MG/DL (ref 8.5–10.1)
CHLORIDE SERPL-SCNC: 110 MMOL/L (ref 97–108)
CO2 SERPL-SCNC: 27 MMOL/L (ref 21–32)
CREAT SERPL-MCNC: 1.29 MG/DL (ref 0.55–1.02)
DIFFERENTIAL METHOD BLD: ABNORMAL
EOSINOPHIL # BLD: 0.4 K/UL (ref 0–0.4)
EOSINOPHIL NFR BLD: 6 % (ref 0–7)
ERYTHROCYTE [DISTWIDTH] IN BLOOD BY AUTOMATED COUNT: 13 % (ref 11.5–14.5)
GLOBULIN SER CALC-MCNC: 3.9 G/DL (ref 2–4)
GLUCOSE SERPL-MCNC: 87 MG/DL (ref 65–100)
HCT VFR BLD AUTO: 35.3 % (ref 35–47)
HGB BLD-MCNC: 11.1 G/DL (ref 11.5–16)
IMM GRANULOCYTES # BLD AUTO: 0 K/UL (ref 0–0.04)
IMM GRANULOCYTES NFR BLD AUTO: 0 % (ref 0–0.5)
LYMPHOCYTES # BLD: 1.5 K/UL (ref 0.8–3.5)
LYMPHOCYTES NFR BLD: 23 % (ref 12–49)
MCH RBC QN AUTO: 29.4 PG (ref 26–34)
MCHC RBC AUTO-ENTMCNC: 31.4 G/DL (ref 30–36.5)
MCV RBC AUTO: 93.4 FL (ref 80–99)
MONOCYTES # BLD: 0.6 K/UL (ref 0–1)
MONOCYTES NFR BLD: 9 % (ref 5–13)
NEUTS SEG # BLD: 4 K/UL (ref 1.8–8)
NEUTS SEG NFR BLD: 61 % (ref 32–75)
NRBC # BLD: 0 K/UL (ref 0–0.01)
NRBC BLD-RTO: 0 PER 100 WBC
NT PRO BNP: 155 PG/ML
PLATELET # BLD AUTO: 254 K/UL (ref 150–400)
PMV BLD AUTO: 10.7 FL (ref 8.9–12.9)
POTASSIUM SERPL-SCNC: 3.5 MMOL/L (ref 3.5–5.1)
PROT SERPL-MCNC: 7.1 G/DL (ref 6.4–8.2)
RBC # BLD AUTO: 3.78 M/UL (ref 3.8–5.2)
SODIUM SERPL-SCNC: 140 MMOL/L (ref 136–145)
WBC # BLD AUTO: 6.6 K/UL (ref 3.6–11)

## 2024-10-26 PROCEDURE — 71045 X-RAY EXAM CHEST 1 VIEW: CPT

## 2024-10-26 PROCEDURE — 80053 COMPREHEN METABOLIC PANEL: CPT

## 2024-10-26 PROCEDURE — 6360000002 HC RX W HCPCS: Performed by: STUDENT IN AN ORGANIZED HEALTH CARE EDUCATION/TRAINING PROGRAM

## 2024-10-26 PROCEDURE — 96375 TX/PRO/DX INJ NEW DRUG ADDON: CPT

## 2024-10-26 PROCEDURE — 2500000003 HC RX 250 WO HCPCS: Performed by: STUDENT IN AN ORGANIZED HEALTH CARE EDUCATION/TRAINING PROGRAM

## 2024-10-26 PROCEDURE — 99285 EMERGENCY DEPT VISIT HI MDM: CPT

## 2024-10-26 PROCEDURE — 83880 ASSAY OF NATRIURETIC PEPTIDE: CPT

## 2024-10-26 PROCEDURE — 96374 THER/PROPH/DIAG INJ IV PUSH: CPT

## 2024-10-26 PROCEDURE — 36415 COLL VENOUS BLD VENIPUNCTURE: CPT

## 2024-10-26 PROCEDURE — 85025 COMPLETE CBC W/AUTO DIFF WBC: CPT

## 2024-10-26 PROCEDURE — 93005 ELECTROCARDIOGRAM TRACING: CPT | Performed by: STUDENT IN AN ORGANIZED HEALTH CARE EDUCATION/TRAINING PROGRAM

## 2024-10-26 PROCEDURE — 2580000003 HC RX 258: Performed by: STUDENT IN AN ORGANIZED HEALTH CARE EDUCATION/TRAINING PROGRAM

## 2024-10-26 RX ADMIN — FAMOTIDINE 20 MG: 10 INJECTION, SOLUTION INTRAVENOUS at 05:57

## 2024-10-26 RX ADMIN — WATER 125 MG: 1 INJECTION INTRAMUSCULAR; INTRAVENOUS; SUBCUTANEOUS at 05:57

## 2024-10-26 NOTE — ED NOTES
Comprehensive verbal and bedside SBAR given to receiving RN (Mariposa). Care transitioned at the bedside. Nurse was also informed of tasks and interventions left pending. I provided the opportunity to ask all questions.       - Pt is alert and confused with no change in mentation, on RA  - Side rails x2, stretcher locked in low position  - Call light within reach  - No acute distress reported or observed  - Lips appear lighter pink, and her voice is slightly less raspy at handoff

## 2024-10-26 NOTE — ED PROVIDER NOTES
EMERGENCY DEPARTMENT HISTORY AND PHYSICAL EXAM      Date: 10/26/2024  Patient Name: Kapil Stone    History of Presenting Illness     Chief Complaint   Patient presents with    Allergic Reaction         HPI: History From: EMS, patient, History limited by: dementia   Kapil Stone, 78 y.o. female presents to the ED with cc of concern for possible allergic reaction.  Per EMS, she had Motrin at 2 AM, then at 5 AM, her lips were noted to be red.  The patient has a history of dementia so is a very poor historian.  She is pleasantly confused, unable to provide reliable history.          There are no other complaints, changes, or physical findings at this time.    PCP: Rosemarie Box MD    No current facility-administered medications on file prior to encounter.     Current Outpatient Medications on File Prior to Encounter   Medication Sig Dispense Refill    acetaminophen (TYLENOL) 325 MG tablet Take 650 mg by mouth every 4 hours as needed      amitriptyline (ELAVIL) 75 MG tablet Take by mouth      benztropine (COGENTIN) 0.5 MG tablet Take 1 mg by mouth 2 times daily      FLUoxetine (PROZAC) 20 MG capsule Take 60 mg by mouth daily      HYDROcodone-acetaminophen (NORCO) 5-325 MG per tablet Take 1 tablet by mouth every 4 hours as needed.      ibuprofen (ADVIL;MOTRIN) 600 MG tablet Take 600 mg by mouth every 6 hours as needed      lidocaine 4 % external patch Place 1 patch onto the skin in the morning and 1 patch in the evening.      loratadine (CLARITIN) 10 MG tablet Take 10 mg by mouth daily as needed      methocarbamol (ROBAXIN) 750 MG tablet Take 750 mg by mouth 4 times daily as needed      ondansetron (ZOFRAN-ODT) 4 MG disintegrating tablet Take 4 mg by mouth every 8 hours as needed      perphenazine 4 MG tablet Take 8 mg by mouth 3 times daily      perphenazine 8 MG tablet Take 16 mg by mouth         Past History     Past Medical History:  Past Medical History:   Diagnosis Date    Anemia     Arthritis     Asthma      Range    NT Pro- <450 PG/ML   Comprehensive Metabolic Panel    Collection Time: 10/26/24  6:32 AM   Result Value Ref Range    Sodium 140 136 - 145 mmol/L    Potassium 3.5 3.5 - 5.1 mmol/L    Chloride 110 (H) 97 - 108 mmol/L    CO2 27 21 - 32 mmol/L    Anion Gap 3 2 - 12 mmol/L    Glucose 87 65 - 100 mg/dL    BUN 30 (H) 6 - 20 MG/DL    Creatinine 1.29 (H) 0.55 - 1.02 MG/DL    BUN/Creatinine Ratio 23 (H) 12 - 20      Est, Glom Filt Rate 42 (L) >60 ml/min/1.73m2    Calcium 8.7 8.5 - 10.1 MG/DL    Total Bilirubin 0.4 0.2 - 1.0 MG/DL    ALT 16 12 - 78 U/L    AST 16 15 - 37 U/L    Alk Phosphatase 101 45 - 117 U/L    Total Protein 7.1 6.4 - 8.2 g/dL    Albumin 3.2 (L) 3.5 - 5.0 g/dL    Globulin 3.9 2.0 - 4.0 g/dL    Albumin/Globulin Ratio 0.8 (L) 1.1 - 2.2         Radiologic Studies -   XR CHEST PORTABLE   Final Result   No acute cardiopulmonary disease.         Electronically signed by Berto Chow MD              Medical Decision Making   I am the first provider for this patient.    I reviewed the vital signs, available nursing notes, past medical history, past surgical history, family history and social history.    Vital Signs-Reviewed the patient's vital signs.  No data found.        Provider Notes (Medical Decision Making):   78-year-old presenting with concern for possible allergic reaction.  She has some erythema of her lips.  No evidence of lip swelling.  Her lips are very dry and cracked with more chronic appearing scabbing, these changes are consistent with chronic process, no signs of hives, urticaria, or acute swelling.  Her lungs are clear to auscultation, no vomiting, abdomen benign and nontender, she is hypertensive otherwise with normal vitals, unlikely anaphylaxis.  She may be having a localized allergic reaction with erythema of her lips.    ED Course:     .    Medications   methylPREDNISolone sodium succ (SOLU-MEDROL) 125 mg in sterile water 2 mL injection (125 mg IntraVENous Given 10/26/24 0557)

## 2024-10-26 NOTE — ED NOTES
Attempted twice to call report to [facility]. Left voicemail at this time. H2H at bedside

## 2024-10-26 NOTE — ED TRIAGE NOTES
Heather WHITEHEAD at bedside for assessment.     BIBEMS from memory care unit with concern for allergic reaction. Report Motrin PO given at 0200 and s/sx onset at 0500. Arrives with raspy, dry voice that EMS reports is worse than when they were on scene. Presents with redness around mouth with dry, cracked lips and tongue. Room air at 96-99%. No acute distress. EMS admin benedryl en route.

## 2024-10-27 LAB
EKG ATRIAL RATE: 60 BPM
EKG DIAGNOSIS: NORMAL
EKG P-R INTERVAL: 184 MS
EKG Q-T INTERVAL: 436 MS
EKG QRS DURATION: 88 MS
EKG QTC CALCULATION (BAZETT): 436 MS
EKG R AXIS: 228 DEGREES
EKG T AXIS: 204 DEGREES
EKG VENTRICULAR RATE: 60 BPM

## 2025-02-18 ENCOUNTER — APPOINTMENT (OUTPATIENT)
Facility: HOSPITAL | Age: 80
End: 2025-02-18
Payer: MEDICARE

## 2025-02-18 ENCOUNTER — HOSPITAL ENCOUNTER (EMERGENCY)
Facility: HOSPITAL | Age: 80
Discharge: HOME | End: 2025-02-18
Attending: EMERGENCY MEDICINE
Payer: MEDICARE

## 2025-02-18 VITALS
DIASTOLIC BLOOD PRESSURE: 148 MMHG | HEART RATE: 65 BPM | SYSTOLIC BLOOD PRESSURE: 168 MMHG | OXYGEN SATURATION: 100 % | TEMPERATURE: 97.9 F | RESPIRATION RATE: 25 BRPM

## 2025-02-18 DIAGNOSIS — E86.0 DEHYDRATION: ICD-10-CM
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
## 2025-02-18 DIAGNOSIS — N17.9 AKI (ACUTE KIDNEY INJURY): Primary | ICD-10-CM

## 2025-02-18 LAB
ALBUMIN SERPL-MCNC: 3.5 G/DL (ref 3.5–5)
ALBUMIN/GLOB SERPL: 0.9 (ref 1.1–2.2)
ALP SERPL-CCNC: 126 U/L (ref 45–117)
ALT SERPL-CCNC: 18 U/L (ref 12–78)
ANION GAP SERPL CALC-SCNC: 2 MMOL/L (ref 2–12)
APPEARANCE UR: CLEAR
AST SERPL-CCNC: 22 U/L (ref 15–37)
BACTERIA URNS QL MICRO: NEGATIVE /HPF
BILIRUB SERPL-MCNC: 0.3 MG/DL (ref 0.2–1)
BILIRUB UR QL: NEGATIVE
BUN SERPL-MCNC: 34 MG/DL (ref 6–20)
BUN/CREAT SERPL: 24 (ref 12–20)
CALCIUM SERPL-MCNC: 9.1 MG/DL (ref 8.5–10.1)
CHLORIDE SERPL-SCNC: 108 MMOL/L (ref 97–108)
CO2 SERPL-SCNC: 29 MMOL/L (ref 21–32)
COLOR UR: NORMAL
COMMENT:: NORMAL
CREAT SERPL-MCNC: 1.42 MG/DL (ref 0.55–1.02)
EPITH CASTS URNS QL MICRO: NORMAL /LPF
ERYTHROCYTE [DISTWIDTH] IN BLOOD BY AUTOMATED COUNT: 12.9 % (ref 11.5–14.5)
ETHANOL SERPL-MCNC: <10 MG/DL (ref 0–0.08)
FLUAV RNA SPEC QL NAA+PROBE: NOT DETECTED
FLUBV RNA SPEC QL NAA+PROBE: NOT DETECTED
GLOBULIN SER CALC-MCNC: 4.1 G/DL (ref 2–4)
GLUCOSE BLD STRIP.AUTO-MCNC: 80 MG/DL (ref 65–117)
GLUCOSE SERPL-MCNC: 97 MG/DL (ref 65–100)
GLUCOSE UR STRIP.AUTO-MCNC: NEGATIVE MG/DL
HCT VFR BLD AUTO: 42.6 % (ref 35–47)
HGB BLD-MCNC: 13 G/DL (ref 11.5–16)
HGB UR QL STRIP: NEGATIVE
HYALINE CASTS URNS QL MICRO: NORMAL /LPF (ref 0–2)
KETONES UR QL STRIP.AUTO: NEGATIVE MG/DL
LACTATE SERPL-SCNC: 0.7 MMOL/L (ref 0.4–2)
LEUKOCYTE ESTERASE UR QL STRIP.AUTO: NEGATIVE
MAGNESIUM SERPL-MCNC: 2.2 MG/DL (ref 1.6–2.4)
MCH RBC QN AUTO: 29.1 PG (ref 26–34)
MCHC RBC AUTO-ENTMCNC: 30.5 G/DL (ref 30–36.5)
MCV RBC AUTO: 95.3 FL (ref 80–99)
NITRITE UR QL STRIP.AUTO: NEGATIVE
NRBC # BLD: 0 K/UL (ref 0–0.01)
NRBC BLD-RTO: 0 PER 100 WBC
PH UR STRIP: 7.5 (ref 5–8)
PLATELET # BLD AUTO: 258 K/UL (ref 150–400)
PMV BLD AUTO: 10.1 FL (ref 8.9–12.9)
POTASSIUM SERPL-SCNC: 4.2 MMOL/L (ref 3.5–5.1)
PROCALCITONIN SERPL-MCNC: 0.05 NG/ML
PROT SERPL-MCNC: 7.6 G/DL (ref 6.4–8.2)
PROT UR STRIP-MCNC: NEGATIVE MG/DL
RBC # BLD AUTO: 4.47 M/UL (ref 3.8–5.2)
RBC #/AREA URNS HPF: NORMAL /HPF (ref 0–5)
SARS-COV-2 RNA RESP QL NAA+PROBE: NOT DETECTED
SERVICE CMNT-IMP: NORMAL
SODIUM SERPL-SCNC: 139 MMOL/L (ref 136–145)
SOURCE: NORMAL
SP GR UR REFRACTOMETRY: 1.01
SPECIMEN HOLD: NORMAL
SPECIMEN HOLD: NORMAL
TROPONIN I SERPL HS-MCNC: 10 NG/L (ref 0–51)
TSH SERPL DL<=0.05 MIU/L-ACNC: 2.63 UIU/ML (ref 0.36–3.74)
UROBILINOGEN UR QL STRIP.AUTO: 0.2 EU/DL (ref 0.2–1)
WBC # BLD AUTO: 6.6 K/UL (ref 3.6–11)
WBC URNS QL MICRO: NORMAL /HPF (ref 0–4)

## 2025-02-18 PROCEDURE — 84145 PROCALCITONIN (PCT): CPT

## 2025-02-18 PROCEDURE — 71046 X-RAY EXAM CHEST 2 VIEWS: CPT

## 2025-02-18 PROCEDURE — 82077 ASSAY SPEC XCP UR&BREATH IA: CPT

## 2025-02-18 PROCEDURE — 96360 HYDRATION IV INFUSION INIT: CPT

## 2025-02-18 PROCEDURE — 83605 ASSAY OF LACTIC ACID: CPT

## 2025-02-18 PROCEDURE — 84443 ASSAY THYROID STIM HORMONE: CPT

## 2025-02-18 PROCEDURE — 80053 COMPREHEN METABOLIC PANEL: CPT

## 2025-02-18 PROCEDURE — 99285 EMERGENCY DEPT VISIT HI MDM: CPT

## 2025-02-18 PROCEDURE — 82962 GLUCOSE BLOOD TEST: CPT

## 2025-02-18 PROCEDURE — 87636 SARSCOV2 & INF A&B AMP PRB: CPT

## 2025-02-18 PROCEDURE — 2580000003 HC RX 258: Performed by: EMERGENCY MEDICINE

## 2025-02-18 PROCEDURE — 36415 COLL VENOUS BLD VENIPUNCTURE: CPT

## 2025-02-18 PROCEDURE — 81001 URINALYSIS AUTO W/SCOPE: CPT

## 2025-02-18 PROCEDURE — 83735 ASSAY OF MAGNESIUM: CPT

## 2025-02-18 PROCEDURE — 85027 COMPLETE CBC AUTOMATED: CPT

## 2025-02-18 PROCEDURE — 70450 CT HEAD/BRAIN W/O DYE: CPT

## 2025-02-18 PROCEDURE — 84484 ASSAY OF TROPONIN QUANT: CPT

## 2025-02-18 RX ORDER — 0.9 % SODIUM CHLORIDE 0.9 %
1000 INTRAVENOUS SOLUTION INTRAVENOUS ONCE
Status: COMPLETED | OUTPATIENT
Start: 2025-02-18 | End: 2025-02-18

## 2025-02-18 RX ADMIN — SODIUM CHLORIDE 1000 ML: 0.9 INJECTION, SOLUTION INTRAVENOUS at 15:55

## 2025-02-18 NOTE — DISCHARGE INSTRUCTIONS
Thank you for choosing our Emergency Department for your care.  It is our privilege to care for you in your time of need.  In the next several days, you may receive a survey via email or mailed to your home about your experience with our team.  We would greatly appreciate you taking a few minutes to complete the survey, as we use this information to learn what we have done well and what we could be doing better. Thank you for trusting us with your care!    Below you will find a list of your tests from today's visit.   Labs and Radiology Studies  Recent Results (from the past 12 hour(s))   POCT Glucose    Collection Time: 02/18/25  1:33 PM   Result Value Ref Range    POC Glucose 80 65 - 117 mg/dL    Performed by: Soila Monge RN    CBC    Collection Time: 02/18/25  2:36 PM   Result Value Ref Range    WBC 6.6 3.6 - 11.0 K/uL    RBC 4.47 3.80 - 5.20 M/uL    Hemoglobin 13.0 11.5 - 16.0 g/dL    Hematocrit 42.6 35.0 - 47.0 %    MCV 95.3 80.0 - 99.0 FL    MCH 29.1 26.0 - 34.0 PG    MCHC 30.5 30.0 - 36.5 g/dL    RDW 12.9 11.5 - 14.5 %    Platelets 258 150 - 400 K/uL    MPV 10.1 8.9 - 12.9 FL    Nucleated RBCs 0.0 0  WBC    nRBC 0.00 0.00 - 0.01 K/uL   Comprehensive Metabolic Panel    Collection Time: 02/18/25  2:36 PM   Result Value Ref Range    Sodium 139 136 - 145 mmol/L    Potassium 4.2 3.5 - 5.1 mmol/L    Chloride 108 97 - 108 mmol/L    CO2 29 21 - 32 mmol/L    Anion Gap 2 2 - 12 mmol/L    Glucose 97 65 - 100 mg/dL    BUN 34 (H) 6 - 20 MG/DL    Creatinine 1.42 (H) 0.55 - 1.02 MG/DL    BUN/Creatinine Ratio 24 (H) 12 - 20      Est, Glom Filt Rate 37 (L) >60 ml/min/1.73m2    Calcium 9.1 8.5 - 10.1 MG/DL    Total Bilirubin 0.3 0.2 - 1.0 MG/DL    ALT 18 12 - 78 U/L    AST 22 15 - 37 U/L    Alk Phosphatase 126 (H) 45 - 117 U/L    Total Protein 7.6 6.4 - 8.2 g/dL    Albumin 3.5 3.5 - 5.0 g/dL    Globulin 4.1 (H) 2.0 - 4.0 g/dL    Albumin/Globulin Ratio 0.9 (L) 1.1 - 2.2     Ethanol    Collection Time: 02/18/25  2:36

## 2025-02-18 NOTE — ED PROVIDER NOTES
[facility] EMERGENCY DEPARTMENT  EMERGENCY DEPARTMENT ENCOUNTER       Pt Name: Kapil Stone  MRN: [id number]  Birthdate 1945  Date of evaluation: 2/18/2025  Provider: Smith Zafar MD   PCP: Rosemarie Box MD  Note Started: 4:25 PM EST 2/18/25     CHIEF COMPLAINT       Chief Complaint   Patient presents with    Fall     Pt had a witnessed GLF 30 minutes ago. Pt coming from [facility], memory care unit. Nursing home unsure cause of fall. Per EMS, Pt did not hit head. Pt A&O x 3. Pt appears to be slurring words and has partial droop on R side of face. Pt currently ha no dentures in. Pt denies hx of stroke. Pt has hx of parkinson. Charisma present in TR, ruling out possible stroke. At this time, Charisma WHITEHEAD states pt does not meet criteria for Code stroke level 1.        HISTORY OF PRESENT ILLNESS: 1 or more elements      History From: Patient, EMS, and Caregiver  HPI Limitations: Dementia     Kapil Stone is a 79 y.o. female who presents with history of dementia, Parkinson's, schizoaffective disorder, medical problems as stated below, presenting with complaints of ground-level fall.  Reportedly patient is not at baseline and there was some report that she may be altered.  I spoke directly with the patient's caregivers report that she seemed \"a little not like her self this morning as she did not come to breakfast that she typically does\".  She was found to be laying on the ground in her room, but was able to be helped up and able to walk unsupported after this time.  Patient also had had some \"congestion\" per the the caregivers at the facility at [facility], and they called 911 to have her brought to the ER for evaluation.  They were concerned she could have also hit her head and cannot had any injury with her fall of the wrist there were no signs of external trauma.     Nursing Notes were all reviewed and agreed with or any disagreements were addressed in the HPI.     REVIEW OF SYSTEMS

## 2025-02-18 NOTE — ED NOTES
Pt attempting to get out of bed. Pt placed on bed alarm and returned back to stretcher, on the monitor x 3, side rails up x 2, call bell in reach. Pt to be discharged. Family on the way to  patient att.

## 2025-02-22 LAB
EKG ATRIAL RATE: 61 BPM
EKG DIAGNOSIS: NORMAL
EKG P AXIS: 51 DEGREES
EKG P-R INTERVAL: 196 MS
EKG Q-T INTERVAL: 436 MS
EKG QRS DURATION: 88 MS
EKG QTC CALCULATION (BAZETT): 438 MS
EKG R AXIS: -36 DEGREES
EKG T AXIS: -28 DEGREES
EKG VENTRICULAR RATE: 61 BPM

## 2025-03-03 ENCOUNTER — HOSPITAL ENCOUNTER (EMERGENCY)
Facility: HOSPITAL | Age: 80
Discharge: HOME | End: 2025-03-03
Attending: EMERGENCY MEDICINE
Payer: MEDICARE

## 2025-03-03 ENCOUNTER — APPOINTMENT (OUTPATIENT)
Facility: HOSPITAL | Age: 80
End: 2025-03-03
Payer: MEDICARE

## 2025-03-03 VITALS
BODY MASS INDEX: 33.98 KG/M2 | RESPIRATION RATE: 16 BRPM | HEIGHT: 64 IN | SYSTOLIC BLOOD PRESSURE: 177 MMHG | HEART RATE: 65 BPM | TEMPERATURE: 98.1 F | OXYGEN SATURATION: 94 % | DIASTOLIC BLOOD PRESSURE: 93 MMHG

## 2025-03-03 DIAGNOSIS — S09.90XA CLOSED HEAD INJURY, INITIAL ENCOUNTER: Primary | ICD-10-CM

## 2025-03-03 DIAGNOSIS — S00.03XA CONTUSION OF SCALP, INITIAL ENCOUNTER: ICD-10-CM
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
## 2025-03-03 PROCEDURE — 70450 CT HEAD/BRAIN W/O DYE: CPT

## 2025-03-03 PROCEDURE — 6370000000 HC RX 637 (ALT 250 FOR IP): Performed by: EMERGENCY MEDICINE

## 2025-03-03 PROCEDURE — 99284 EMERGENCY DEPT VISIT MOD MDM: CPT

## 2025-03-03 RX ORDER — ACETAMINOPHEN 325 MG/1
650 TABLET ORAL
Status: COMPLETED | OUTPATIENT
Start: 2025-03-03 | End: 2025-03-03

## 2025-03-03 RX ADMIN — ACETAMINOPHEN 650 MG: 325 TABLET ORAL at 14:51

## 2025-03-03 NOTE — ED NOTES
RN assumed care of pt at this time, per triage note:    Patient had an unwitnessed fall at her facility today. History of schizophrenia/parkinsons. Patient with small contusion to the back of her head. Patient is a poor historian and facility reports her at baseline

## 2025-03-26 ENCOUNTER — APPOINTMENT (OUTPATIENT)
Facility: HOSPITAL | Age: 80
End: 2025-03-26
Payer: MEDICARE

## 2025-03-26 ENCOUNTER — HOSPITAL ENCOUNTER (EMERGENCY)
Facility: HOSPITAL | Age: 80
Discharge: HOME | End: 2025-03-26
Payer: MEDICARE

## 2025-03-26 VITALS
WEIGHT: 198 LBS | RESPIRATION RATE: 16 BRPM | DIASTOLIC BLOOD PRESSURE: 99 MMHG | TEMPERATURE: 97.5 F | OXYGEN SATURATION: 95 % | HEIGHT: 64 IN | SYSTOLIC BLOOD PRESSURE: 178 MMHG | HEART RATE: 63 BPM | BODY MASS INDEX: 33.8 KG/M2

## 2025-03-26 DIAGNOSIS — R41.82 ALTERED MENTAL STATUS, UNSPECIFIED ALTERED MENTAL STATUS TYPE: Primary | ICD-10-CM

## 2025-03-26 LAB
ALBUMIN SERPL-MCNC: 3.6 G/DL (ref 3.5–5)
ALBUMIN/GLOB SERPL: 0.8 (ref 1.1–2.2)
ALP SERPL-CCNC: 126 U/L (ref 45–117)
ALT SERPL-CCNC: 20 U/L (ref 12–78)
AMPHET UR QL SCN: NEGATIVE
ANION GAP SERPL CALC-SCNC: 3 MMOL/L (ref 2–12)
APPEARANCE UR: CLEAR
AST SERPL-CCNC: 18 U/L (ref 15–37)
BACTERIA URNS QL MICRO: NEGATIVE /HPF
BARBITURATES UR QL SCN: NEGATIVE
BASE EXCESS BLDV CALC-SCNC: 4.5 MMOL/L
BASOPHILS # BLD: 0.03 K/UL (ref 0–0.1)
BASOPHILS NFR BLD: 0.5 % (ref 0–1)
BENZODIAZ UR QL: NEGATIVE
BILIRUB SERPL-MCNC: 0.3 MG/DL (ref 0.2–1)
BILIRUB UR QL: NEGATIVE
BUN SERPL-MCNC: 36 MG/DL (ref 6–20)
BUN/CREAT SERPL: 24 (ref 12–20)
CALCIUM SERPL-MCNC: 9.5 MG/DL (ref 8.5–10.1)
CANNABINOIDS UR QL SCN: NEGATIVE
CHLORIDE SERPL-SCNC: 108 MMOL/L (ref 97–108)
CO2 SERPL-SCNC: 30 MMOL/L (ref 21–32)
COCAINE UR QL SCN: NEGATIVE
COLOR UR: ABNORMAL
CREAT SERPL-MCNC: 1.48 MG/DL (ref 0.55–1.02)
DIFFERENTIAL METHOD BLD: NORMAL
EOSINOPHIL # BLD: 0.27 K/UL (ref 0–0.4)
EOSINOPHIL NFR BLD: 4.1 % (ref 0–7)
EPITH CASTS URNS QL MICRO: ABNORMAL /LPF
ERYTHROCYTE [DISTWIDTH] IN BLOOD BY AUTOMATED COUNT: 13 % (ref 11.5–14.5)
ETHANOL SERPL-MCNC: <10 MG/DL (ref 0–0.08)
FLUAV RNA SPEC QL NAA+PROBE: NOT DETECTED
FLUBV RNA SPEC QL NAA+PROBE: NOT DETECTED
GLOBULIN SER CALC-MCNC: 4.5 G/DL (ref 2–4)
GLUCOSE SERPL-MCNC: 115 MG/DL (ref 65–100)
GLUCOSE UR STRIP.AUTO-MCNC: NEGATIVE MG/DL
HCO3 BLDV-SCNC: 31.8 MMOL/L (ref 23–28)
HCT VFR BLD AUTO: 40.1 % (ref 35–47)
HGB BLD-MCNC: 12.5 G/DL (ref 11.5–16)
HGB UR QL STRIP: NEGATIVE
HYALINE CASTS URNS QL MICRO: ABNORMAL /LPF (ref 0–2)
IMM GRANULOCYTES # BLD AUTO: 0.02 K/UL (ref 0–0.04)
IMM GRANULOCYTES NFR BLD AUTO: 0.3 % (ref 0–0.5)
KETONES UR QL STRIP.AUTO: NEGATIVE MG/DL
LEUKOCYTE ESTERASE UR QL STRIP.AUTO: NEGATIVE
LYMPHOCYTES # BLD: 1.24 K/UL (ref 0.8–3.5)
LYMPHOCYTES NFR BLD: 18.9 % (ref 12–49)
Lab: NORMAL
MAGNESIUM SERPL-MCNC: 2.5 MG/DL (ref 1.6–2.4)
MCH RBC QN AUTO: 29.8 PG (ref 26–34)
MCHC RBC AUTO-ENTMCNC: 31.2 G/DL (ref 30–36.5)
MCV RBC AUTO: 95.5 FL (ref 80–99)
METHADONE UR QL: NEGATIVE
MONOCYTES # BLD: 0.52 K/UL (ref 0–1)
MONOCYTES NFR BLD: 7.9 % (ref 5–13)
NEUTS SEG # BLD: 4.49 K/UL (ref 1.8–8)
NEUTS SEG NFR BLD: 68.3 % (ref 32–75)
NITRITE UR QL STRIP.AUTO: NEGATIVE
NRBC # BLD: 0 K/UL (ref 0–0.01)
NRBC BLD-RTO: 0 PER 100 WBC
OPIATES UR QL: NEGATIVE
PCO2 BLDV: 56.9 MMHG (ref 41–51)
PCP UR QL: NEGATIVE
PH BLDV: 7.36 (ref 7.32–7.42)
PH UR STRIP: 7 (ref 5–8)
PLATELET # BLD AUTO: 266 K/UL (ref 150–400)
PMV BLD AUTO: 10.5 FL (ref 8.9–12.9)
PO2 BLDV: <27 MMHG (ref 25–40)
POTASSIUM SERPL-SCNC: 3.9 MMOL/L (ref 3.5–5.1)
PROT SERPL-MCNC: 8.1 G/DL (ref 6.4–8.2)
PROT UR STRIP-MCNC: ABNORMAL MG/DL
RBC # BLD AUTO: 4.2 M/UL (ref 3.8–5.2)
RBC #/AREA URNS HPF: ABNORMAL /HPF (ref 0–5)
SARS-COV-2 RNA RESP QL NAA+PROBE: NOT DETECTED
SERVICE CMNT-IMP: ABNORMAL
SODIUM SERPL-SCNC: 141 MMOL/L (ref 136–145)
SOURCE: NORMAL
SP GR UR REFRACTOMETRY: 1.01
SPECIMEN TYPE: ABNORMAL
URINE CULTURE IF INDICATED: ABNORMAL
UROBILINOGEN UR QL STRIP.AUTO: 0.2 EU/DL (ref 0.2–1)
WBC # BLD AUTO: 6.6 K/UL (ref 3.6–11)
WBC URNS QL MICRO: ABNORMAL /HPF (ref 0–4)

## 2025-03-26 PROCEDURE — 81001 URINALYSIS AUTO W/SCOPE: CPT

## 2025-03-26 PROCEDURE — 70450 CT HEAD/BRAIN W/O DYE: CPT

## 2025-03-26 PROCEDURE — 85025 COMPLETE CBC W/AUTO DIFF WBC: CPT

## 2025-03-26 PROCEDURE — 80053 COMPREHEN METABOLIC PANEL: CPT

## 2025-03-26 PROCEDURE — 99284 EMERGENCY DEPT VISIT MOD MDM: CPT

## 2025-03-26 PROCEDURE — 82077 ASSAY SPEC XCP UR&BREATH IA: CPT

## 2025-03-26 PROCEDURE — 82803 BLOOD GASES ANY COMBINATION: CPT

## 2025-03-26 PROCEDURE — 71045 X-RAY EXAM CHEST 1 VIEW: CPT

## 2025-03-26 PROCEDURE — 80307 DRUG TEST PRSMV CHEM ANLYZR: CPT

## 2025-03-26 PROCEDURE — 36415 COLL VENOUS BLD VENIPUNCTURE: CPT

## 2025-03-26 PROCEDURE — 83735 ASSAY OF MAGNESIUM: CPT

## 2025-03-26 PROCEDURE — 87636 SARSCOV2 & INF A&B AMP PRB: CPT

## 2025-03-26 ASSESSMENT — PAIN SCALES - GENERAL
PAINLEVEL_OUTOF10: 0
PAINLEVEL_OUTOF10: 0

## 2025-03-26 ASSESSMENT — LIFESTYLE VARIABLES
HOW MANY STANDARD DRINKS CONTAINING ALCOHOL DO YOU HAVE ON A TYPICAL DAY: PATIENT DOES NOT DRINK
HOW OFTEN DO YOU HAVE A DRINK CONTAINING ALCOHOL: NEVER

## 2025-03-26 ASSESSMENT — PAIN - FUNCTIONAL ASSESSMENT: PAIN_FUNCTIONAL_ASSESSMENT: 0-10

## 2025-03-26 NOTE — DISCHARGE INSTRUCTIONS
changes or other symptoms that concern you, return to the ED sooner. If you feel worse over the next 24 hours, please return to the ED. We are available 24 hours a day. Thank you for trusting us with your care!

## 2025-03-26 NOTE — ED NOTES
Discharge medications reviewed with the patient and [facility] transporters and appropriate educational materials and side effects teaching were provided.

## 2025-03-26 NOTE — ED PROVIDER NOTES
[facility] EMERGENCY DEPARTMENT  EMERGENCY DEPARTMENT ENCOUNTER    Patient Name: Kapil Stone  MRN: [id number]  YOB: 1945  Provider: Ken San MD  PCP: Rosemarie Box MD  Time/Date of evaluation:  3/26/25    History of Presenting Illness     Chief Complaint   Patient presents with    Altered Mental Status     BIBEMS from [facility] for AMS and \"unable to walk\" last known well was yesterday evening before shift change; hx of dementia. No c/o chest pain, SOB.  per EMS.       HISTORY (Narrative):   Kapil Stone is a 79 y.o. female presents to the Emergency Department with altered mental status and excessive sleepiness.  Staff at the facility have noted recent changes in the patient's behavior. The patient appears confused and has difficulty providing their name when asked multiple times. Reports taking unspecified medication for sleep \"sometimes,\" but no further details about the medication or frequency of use are provided. When asked about pain, the patient does not report any specific complaints. Demonstrates difficulty following commands and moving extremities, requiring repeated prompting to lift arms and legs, suggesting potential motor weakness or altered mental status affecting ability to respond to commands.    Current and Past Medications and Supplements  - Unspecified medication for sleep (taken \"sometimes\")    Social History  - Living situation: Resides at [facility]  - Substance use: Sometimes uses sleep aids    Review of Systems  - General: Excessive sleepiness  - Neurological: Altered mental status, difficulty following commands, potential motor weakness  - Psychiatric: Recent behavioral changes noted by others      Nursing Notes were all reviewed and agreed with or any disagreements were addressed in the HPI.    Past History     PAST MEDICAL HISTORY:  Past Medical History:   Diagnosis Date    Anemia     Arthritis     Asthma     Cancer (HCC)     skin

## 2025-03-31 ENCOUNTER — APPOINTMENT (OUTPATIENT)
Facility: HOSPITAL | Age: 80
End: 2025-03-31
Payer: MEDICARE

## 2025-03-31 ENCOUNTER — HOSPITAL ENCOUNTER (EMERGENCY)
Facility: HOSPITAL | Age: 80
Discharge: HOME | End: 2025-03-31
Attending: STUDENT IN AN ORGANIZED HEALTH CARE EDUCATION/TRAINING PROGRAM
Payer: MEDICARE

## 2025-03-31 VITALS
SYSTOLIC BLOOD PRESSURE: 175 MMHG | DIASTOLIC BLOOD PRESSURE: 88 MMHG | RESPIRATION RATE: 20 BRPM | HEIGHT: 64 IN | HEART RATE: 75 BPM | WEIGHT: 198.19 LBS | OXYGEN SATURATION: 98 % | BODY MASS INDEX: 33.84 KG/M2 | TEMPERATURE: 98.1 F

## 2025-03-31 DIAGNOSIS — R13.10 DYSPHAGIA, UNSPECIFIED TYPE: Primary | ICD-10-CM

## 2025-03-31 LAB
ALBUMIN SERPL-MCNC: 3.2 G/DL (ref 3.5–5)
ALBUMIN/GLOB SERPL: 0.8 (ref 1.1–2.2)
ALP SERPL-CCNC: 116 U/L (ref 45–117)
ALT SERPL-CCNC: 19 U/L (ref 12–78)
ANION GAP SERPL CALC-SCNC: 2 MMOL/L (ref 2–12)
AST SERPL-CCNC: 18 U/L (ref 15–37)
BASOPHILS # BLD: 0.04 K/UL (ref 0–0.1)
BASOPHILS NFR BLD: 0.5 % (ref 0–1)
BILIRUB SERPL-MCNC: 0.7 MG/DL (ref 0.2–1)
BUN SERPL-MCNC: 32 MG/DL (ref 6–20)
BUN/CREAT SERPL: 25 (ref 12–20)
CALCIUM SERPL-MCNC: 9.3 MG/DL (ref 8.5–10.1)
CHLORIDE SERPL-SCNC: 108 MMOL/L (ref 97–108)
CO2 SERPL-SCNC: 29 MMOL/L (ref 21–32)
CREAT SERPL-MCNC: 1.3 MG/DL (ref 0.55–1.02)
DIFFERENTIAL METHOD BLD: ABNORMAL
EOSINOPHIL # BLD: 0.2 K/UL (ref 0–0.4)
EOSINOPHIL NFR BLD: 2.3 % (ref 0–7)
ERYTHROCYTE [DISTWIDTH] IN BLOOD BY AUTOMATED COUNT: 13.1 % (ref 11.5–14.5)
GLOBULIN SER CALC-MCNC: 4.2 G/DL (ref 2–4)
GLUCOSE SERPL-MCNC: 109 MG/DL (ref 65–100)
HCT VFR BLD AUTO: 37.4 % (ref 35–47)
HGB BLD-MCNC: 11.5 G/DL (ref 11.5–16)
IMM GRANULOCYTES # BLD AUTO: 0.03 K/UL (ref 0–0.04)
IMM GRANULOCYTES NFR BLD AUTO: 0.3 % (ref 0–0.5)
LYMPHOCYTES # BLD: 1.07 K/UL (ref 0.8–3.5)
LYMPHOCYTES NFR BLD: 12.1 % (ref 12–49)
MCH RBC QN AUTO: 29.5 PG (ref 26–34)
MCHC RBC AUTO-ENTMCNC: 30.7 G/DL (ref 30–36.5)
MCV RBC AUTO: 95.9 FL (ref 80–99)
MONOCYTES # BLD: 0.53 K/UL (ref 0–1)
MONOCYTES NFR BLD: 6 % (ref 5–13)
NEUTS SEG # BLD: 6.97 K/UL (ref 1.8–8)
NEUTS SEG NFR BLD: 78.8 % (ref 32–75)
NRBC # BLD: 0 K/UL (ref 0–0.01)
NRBC BLD-RTO: 0 PER 100 WBC
PLATELET # BLD AUTO: 240 K/UL (ref 150–400)
PMV BLD AUTO: 10 FL (ref 8.9–12.9)
POTASSIUM SERPL-SCNC: 3.8 MMOL/L (ref 3.5–5.1)
PROT SERPL-MCNC: 7.4 G/DL (ref 6.4–8.2)
RBC # BLD AUTO: 3.9 M/UL (ref 3.8–5.2)
SODIUM SERPL-SCNC: 139 MMOL/L (ref 136–145)
WBC # BLD AUTO: 8.8 K/UL (ref 3.6–11)

## 2025-03-31 PROCEDURE — 99284 EMERGENCY DEPT VISIT MOD MDM: CPT

## 2025-03-31 PROCEDURE — 36415 COLL VENOUS BLD VENIPUNCTURE: CPT

## 2025-03-31 PROCEDURE — 71045 X-RAY EXAM CHEST 1 VIEW: CPT

## 2025-03-31 PROCEDURE — 85025 COMPLETE CBC W/AUTO DIFF WBC: CPT

## 2025-03-31 PROCEDURE — 6360000002 HC RX W HCPCS: Performed by: STUDENT IN AN ORGANIZED HEALTH CARE EDUCATION/TRAINING PROGRAM

## 2025-03-31 PROCEDURE — 80053 COMPREHEN METABOLIC PANEL: CPT

## 2025-03-31 RX ORDER — GLUCAGON 1 MG/ML
1 KIT INJECTION ONCE
Status: COMPLETED | OUTPATIENT
Start: 2025-03-31 | End: 2025-03-31

## 2025-03-31 RX ORDER — PANTOPRAZOLE SODIUM 20 MG/1
20 TABLET, DELAYED RELEASE ORAL
Qty: 14 TABLET | Refills: 0 | Status: SHIPPED | OUTPATIENT
Start: 2025-03-31

## 2025-03-31 RX ADMIN — GLUCAGON 1 MG: 1 INJECTION, POWDER, LYOPHILIZED, FOR SOLUTION INTRAMUSCULAR; INTRAVENOUS at 13:23

## 2025-03-31 ASSESSMENT — PAIN SCALES - GENERAL: PAINLEVEL_OUTOF10: 0

## 2025-03-31 ASSESSMENT — PAIN - FUNCTIONAL ASSESSMENT: PAIN_FUNCTIONAL_ASSESSMENT: 0-10

## 2025-03-31 NOTE — ED PROVIDER NOTES
EMERGENCY DEPARTMENT HISTORY AND PHYSICAL EXAM      Date: 3/31/2025  Patient Name: Kapil Stone    History of Presenting Illness     Chief Complaint   Patient presents with    food impaction     Patient arrives with EMS from [facility] for possible aspiration/food impaction during lunch today. Patient alert; sats are WNL however patient is not able to swallow her own saliva. Pt has hx of dementia and is unable to definitively explain how she feels and or if she feels like something is stuck in her throat or not.         HPI: History From: EMS, patient, History limited by: Dementia   Kapil Stone, 79 y.o. female presents to the ED with cc of concern for aspiration or food bolus.  She was eating some broccoli, when suddenly had difficulty swallowing, drooling, inability to tolerate p.o.  She states that she does not feel good, however she is otherwise unable to elaborate.  She has a history of significant dementia.  She is emesis bag in hand, spitting into this.          There are no other complaints, changes, or physical findings at this time.    PCP: Rosemarie Box MD    No current facility-administered medications on file prior to encounter.     Current Outpatient Medications on File Prior to Encounter   Medication Sig Dispense Refill    acetaminophen (TYLENOL) 325 MG tablet Take 650 mg by mouth every 4 hours as needed      amitriptyline (ELAVIL) 75 MG tablet Take by mouth      benztropine (COGENTIN) 0.5 MG tablet Take 1 mg by mouth 2 times daily      FLUoxetine (PROZAC) 20 MG capsule Take 60 mg by mouth daily      HYDROcodone-acetaminophen (NORCO) 5-325 MG per tablet Take 1 tablet by mouth every 4 hours as needed.      ibuprofen (ADVIL;MOTRIN) 600 MG tablet Take 600 mg by mouth every 6 hours as needed      lidocaine 4 % external patch Place 1 patch onto the skin in the morning and 1 patch in the evening.      loratadine (CLARITIN) 10 MG tablet Take 10 mg by mouth daily as needed      methocarbamol (ROBAXIN) 750 MG

## 2025-05-28 NOTE — ED NOTES
Patient discharged from the ED by Dr Hartmann. Diagnosis, medications, precautions and follow-ups were reviewed with the patient/family. Questions were asked and answered prior to departure. Patient departed the ED via stretcher and was accompanied by [facility] staff.    0423: Neo Parker notified that patient was discharged and on the way back to the facility. Brief report given, all questions answered. Discharge paperwork given to [facility] staff to give to facility.   
Patient to CT  
Yes

## 2025-07-21 ENCOUNTER — APPOINTMENT (OUTPATIENT)
Facility: HOSPITAL | Age: 80
End: 2025-07-21
Payer: MEDICARE

## 2025-07-21 ENCOUNTER — HOSPITAL ENCOUNTER (EMERGENCY)
Facility: HOSPITAL | Age: 80
Discharge: HOME | End: 2025-07-21
Attending: EMERGENCY MEDICINE
Payer: MEDICARE

## 2025-07-21 VITALS
OXYGEN SATURATION: 100 % | BODY MASS INDEX: 33.84 KG/M2 | DIASTOLIC BLOOD PRESSURE: 79 MMHG | WEIGHT: 198.19 LBS | RESPIRATION RATE: 15 BRPM | SYSTOLIC BLOOD PRESSURE: 177 MMHG | HEART RATE: 60 BPM | HEIGHT: 64 IN | TEMPERATURE: 97.7 F

## 2025-07-21 DIAGNOSIS — R41.82 ALTERED MENTAL STATUS, UNSPECIFIED ALTERED MENTAL STATUS TYPE: Primary | ICD-10-CM

## 2025-07-21 LAB
ALBUMIN SERPL-MCNC: 3.2 G/DL (ref 3.5–5)
ALBUMIN/GLOB SERPL: 0.8 (ref 1.1–2.2)
ALP SERPL-CCNC: 96 U/L (ref 45–117)
ALT SERPL-CCNC: 13 U/L (ref 12–78)
ANION GAP SERPL CALC-SCNC: 4 MMOL/L (ref 2–12)
APPEARANCE UR: CLEAR
AST SERPL-CCNC: 14 U/L (ref 15–37)
BACTERIA URNS QL MICRO: NEGATIVE /HPF
BASOPHILS # BLD: 0.05 K/UL (ref 0–0.1)
BASOPHILS NFR BLD: 0.7 % (ref 0–1)
BILIRUB SERPL-MCNC: 0.9 MG/DL (ref 0.2–1)
BILIRUB UR QL: NEGATIVE
BUN SERPL-MCNC: 37 MG/DL (ref 6–20)
BUN/CREAT SERPL: 27 (ref 12–20)
CALCIUM SERPL-MCNC: 9.3 MG/DL (ref 8.5–10.1)
CHLORIDE SERPL-SCNC: 106 MMOL/L (ref 97–108)
CO2 SERPL-SCNC: 29 MMOL/L (ref 21–32)
COLOR UR: NORMAL
CREAT SERPL-MCNC: 1.36 MG/DL (ref 0.55–1.02)
DIFFERENTIAL METHOD BLD: ABNORMAL
EKG ATRIAL RATE: 62 BPM
EKG DIAGNOSIS: NORMAL
EKG P AXIS: 39 DEGREES
EKG P-R INTERVAL: 176 MS
EKG Q-T INTERVAL: 432 MS
EKG QRS DURATION: 92 MS
EKG QTC CALCULATION (BAZETT): 438 MS
EKG R AXIS: -56 DEGREES
EKG T AXIS: -26 DEGREES
EKG VENTRICULAR RATE: 62 BPM
EOSINOPHIL # BLD: 0.24 K/UL (ref 0–0.4)
EOSINOPHIL NFR BLD: 3.5 % (ref 0–7)
EPITH CASTS URNS QL MICRO: NORMAL /LPF
ERYTHROCYTE [DISTWIDTH] IN BLOOD BY AUTOMATED COUNT: 12.5 % (ref 11.5–14.5)
GLOBULIN SER CALC-MCNC: 4 G/DL (ref 2–4)
GLUCOSE BLD STRIP.AUTO-MCNC: 77 MG/DL (ref 65–117)
GLUCOSE SERPL-MCNC: 78 MG/DL (ref 65–100)
GLUCOSE UR STRIP.AUTO-MCNC: NEGATIVE MG/DL
HCT VFR BLD AUTO: 34 % (ref 35–47)
HGB BLD-MCNC: 10.3 G/DL (ref 11.5–16)
HGB UR QL STRIP: NEGATIVE
HYALINE CASTS URNS QL MICRO: NORMAL /LPF (ref 0–2)
IMM GRANULOCYTES # BLD AUTO: 0.03 K/UL (ref 0–0.04)
IMM GRANULOCYTES NFR BLD AUTO: 0.4 % (ref 0–0.5)
KETONES UR QL STRIP.AUTO: NEGATIVE MG/DL
LEUKOCYTE ESTERASE UR QL STRIP.AUTO: NEGATIVE
LYMPHOCYTES # BLD: 1.85 K/UL (ref 0.8–3.5)
LYMPHOCYTES NFR BLD: 26.9 % (ref 12–49)
MCH RBC QN AUTO: 29.1 PG (ref 26–34)
MCHC RBC AUTO-ENTMCNC: 30.3 G/DL (ref 30–36.5)
MCV RBC AUTO: 96 FL (ref 80–99)
MONOCYTES # BLD: 0.56 K/UL (ref 0–1)
MONOCYTES NFR BLD: 8.1 % (ref 5–13)
NEUTS SEG # BLD: 4.15 K/UL (ref 1.8–8)
NEUTS SEG NFR BLD: 60.4 % (ref 32–75)
NITRITE UR QL STRIP.AUTO: NEGATIVE
NRBC # BLD: 0 K/UL (ref 0–0.01)
NRBC BLD-RTO: 0 PER 100 WBC
PH UR STRIP: 8 (ref 5–8)
PLATELET # BLD AUTO: 215 K/UL (ref 150–400)
PMV BLD AUTO: 10.1 FL (ref 8.9–12.9)
POTASSIUM SERPL-SCNC: 3.7 MMOL/L (ref 3.5–5.1)
PROT SERPL-MCNC: 7.2 G/DL (ref 6.4–8.2)
PROT UR STRIP-MCNC: NEGATIVE MG/DL
RBC # BLD AUTO: 3.54 M/UL (ref 3.8–5.2)
RBC #/AREA URNS HPF: NORMAL /HPF (ref 0–5)
SERVICE CMNT-IMP: NORMAL
SODIUM SERPL-SCNC: 139 MMOL/L (ref 136–145)
SP GR UR REFRACTOMETRY: 1.01
URINE CULTURE IF INDICATED: NORMAL
UROBILINOGEN UR QL STRIP.AUTO: 0.2 EU/DL (ref 0.2–1)
VALPROATE SERPL-MCNC: 26 UG/ML (ref 50–100)
WBC # BLD AUTO: 6.9 K/UL (ref 3.6–11)
WBC URNS QL MICRO: NORMAL /HPF (ref 0–4)

## 2025-07-21 PROCEDURE — 80164 ASSAY DIPROPYLACETIC ACD TOT: CPT

## 2025-07-21 PROCEDURE — 70450 CT HEAD/BRAIN W/O DYE: CPT

## 2025-07-21 PROCEDURE — 94761 N-INVAS EAR/PLS OXIMETRY MLT: CPT

## 2025-07-21 PROCEDURE — 99285 EMERGENCY DEPT VISIT HI MDM: CPT

## 2025-07-21 PROCEDURE — 36415 COLL VENOUS BLD VENIPUNCTURE: CPT

## 2025-07-21 PROCEDURE — 80053 COMPREHEN METABOLIC PANEL: CPT

## 2025-07-21 PROCEDURE — 85025 COMPLETE CBC W/AUTO DIFF WBC: CPT

## 2025-07-21 PROCEDURE — 71045 X-RAY EXAM CHEST 1 VIEW: CPT

## 2025-07-21 PROCEDURE — 51701 INSERT BLADDER CATHETER: CPT

## 2025-07-21 PROCEDURE — 93005 ELECTROCARDIOGRAM TRACING: CPT | Performed by: EMERGENCY MEDICINE

## 2025-07-21 PROCEDURE — 82962 GLUCOSE BLOOD TEST: CPT

## 2025-07-21 PROCEDURE — 81001 URINALYSIS AUTO W/SCOPE: CPT

## 2025-07-21 ASSESSMENT — PAIN SCALES - GENERAL
PAINLEVEL_OUTOF10: 0
PAINLEVEL_OUTOF10: 0

## 2025-07-21 NOTE — ED TRIAGE NOTES
Pt presents to ED via EMS from [facility] and is moved off the stretcher. EMS reports facility called for concern of decrease urine output and AMS. Pt A&OX4 at this time. H/o UTI.

## 2025-07-21 NOTE — ED PROVIDER NOTES
[facility] EMERGENCY DEPARTMENT  EMERGENCY DEPARTMENT ENCOUNTER       Pt Name: Kapil Stone  MRN: [id number]  Birthdate 1945  Date of evaluation: 7/21/2025  Provider: Samantha Bronson MD   PCP: Rosemarie Box MD  Note Started: 12:21 PM EDT 7/21/25     CHIEF COMPLAINT       Chief Complaint   Patient presents with    Urinary Retention    Altered Mental Status     Pt presents to ED via EMS from [facility] and is moved off the stretcher. EMS reports facility called for concern of decrease urine output and AMS. Pt A&OX4 at this time. H/o UTI.        HISTORY OF PRESENT ILLNESS: 1 or more elements      History From: patient, ems, History limited by: none     Kapil Stone is a 79 y.o. female who presents with a cc of concerns for decreased UOP and AMS       Please See MDM for Additional Details of the HPI/PMH  Nursing Notes were all reviewed and agreed with or any disagreements were addressed in the HPI.     REVIEW OF SYSTEMS        Positives and Pertinent negatives as per HPI.    PAST HISTORY     Past Medical History:  Past Medical History:   Diagnosis Date    Anemia     Arthritis     Asthma     Cancer (HCC)     skin    Conjunctivitis     Depression     Edema     History of Bell's palsy 3/30/2010    Obesity, unspecified     Other ill-defined conditions(799.89)     urinary incontinence    Parkinson's disease (HCC)     Psychiatric disorder     schizoaffective    Unspecified schizophrenia, unspecified condition     Varicose veins        Past Surgical History:  Past Surgical History:   Procedure Laterality Date    DILATION AND CURETTAGE OF UTERUS      OTHER SURGICAL HISTORY  12/06/2018    BIOPSY ON RT. THIGH / DR. LUC GRANT       Family History:  Family History   Problem Relation Age of Onset    Breast Cancer Maternal Grandmother     Cancer Maternal Grandmother         breast       Social History:  Social History     Tobacco Use    Smoking status: Never    Smokeless tobacco: Never   Vaping Use

## 2025-07-21 NOTE — ED NOTES
Attempted to obtain urine. Pt unable to at this time. Discussed purewick with pt. Pt provided verbal consent. Purewick placed on pt. Pt tolerating well.

## 2025-07-21 NOTE — ED NOTES
Pt/pt POA consented to straight cath. Straight cath performed utilizing sterile technique. No complications. Pt tolerated well. Pt hooked back up to [facility].

## 2025-07-21 NOTE — DISCHARGE INSTRUCTIONS
Lab work showed a mildly subtherapeutic depakote level. Otherwise no acute concerns. No urinary retention noted while in the ED    Thank You!    It was a pleasure taking care of you in our Emergency Department today. We know that when you come to our Emergency Department, you are entrusting us with your health, comfort, and safety. Our physicians and nurses honor that trust, and truly appreciate the opportunity to care for you and your loved ones.      We also value your feedback. If you receive a survey about your Emergency Department experience today, please fill it out.  We care about our patients' feedback, and we listen to what you have to say.  Thank you.    Samantha Bronson MD  ________________________________________________________________________  I have included a copy of your lab results and/or radiologic studies from today's visit so you can have them easily available at your follow-up visit. We hope you feel better and please do not hesitate to contact the ED if you have any questions at all!    Recent Results (from the past 12 hours)   EKG 12 Lead    Collection Time: 07/21/25  9:19 AM   Result Value Ref Range    Ventricular Rate 62 BPM    Atrial Rate 62 BPM    P-R Interval 176 ms    QRS Duration 92 ms    Q-T Interval 432 ms    QTc Calculation (Bazett) 438 ms    P Axis 39 degrees    R Axis -56 degrees    T Axis -26 degrees    Diagnosis       Normal sinus rhythm  Left anterior fascicular block  Moderate voltage criteria for LVH, may be normal variant  Nonspecific ST and T wave abnormality    independently interpreted and  Confirmed by Samantha Bronson MD ([id number]) on 7/21/2025 9:27:20 AM     CBC with Auto Differential    Collection Time: 07/21/25  9:35 AM   Result Value Ref Range    WBC 6.9 3.6 - 11.0 K/uL    RBC 3.54 (L) 3.80 - 5.20 M/uL    Hemoglobin 10.3 (L) 11.5 - 16.0 g/dL    Hematocrit 34.0 (L) 35.0 - 47.0 %    MCV 96.0 80.0 - 99.0 FL    MCH 29.1 26.0 - 34.0 PG    MCHC 30.3 30.0 - 36.5 g/dL

## (undated) DEVICE — REM POLYHESIVE ADULT PATIENT RETURN ELECTRODE: Brand: VALLEYLAB

## (undated) DEVICE — HANDLE LT SNAP ON ULT DURABLE LENS FOR TRUMPF ALC DISPOSABLE

## (undated) DEVICE — APPLICATOR BNDG 1MM ADH PREMIERPRO EXOFIN

## (undated) DEVICE — SMOKE EVACUATION PENCIL: Brand: VALLEYLAB

## (undated) DEVICE — DEVON™ KNEE AND BODY STRAP 60" X 3" (1.5 M X 7.6 CM): Brand: DEVON

## (undated) DEVICE — SOLUTION IV 1000ML 0.9% SOD CHL

## (undated) DEVICE — STERILE POLYISOPRENE POWDER-FREE SURGICAL GLOVES WITH EMOLLIENT COATING: Brand: PROTEXIS

## (undated) DEVICE — NEEDLE HYPO 22GA L1.5IN BLK S STL HUB POLYPR SHLD REG BVL

## (undated) DEVICE — SUTURE VCRL SZ 4-0 L27IN ABSRB UD L19MM PS-2 3/8 CIR PRIM J426H

## (undated) DEVICE — (D)PREP SKN CHLRAPRP APPL 26ML -- CONVERT TO ITEM 371833

## (undated) DEVICE — SUTURE ABSORBABLE BRAIDED 3-0 SHB 18 IN UD VICRYL + VCPB864D

## (undated) DEVICE — GOWN,PREVENTION PLUS,XL,ST,24/CS: Brand: MEDLINE

## (undated) DEVICE — STERILE POLYISOPRENE POWDER-FREE SURGICAL GLOVES: Brand: PROTEXIS

## (undated) DEVICE — INFECTION CONTROL KIT SYS

## (undated) DEVICE — Device

## (undated) DEVICE — SHEET, T, LAPAROTOMY, STERILE: Brand: MEDLINE

## (undated) DEVICE — KENDALL SCD EXPRESS SLEEVES, KNEE LENGTH, MEDIUM: Brand: KENDALL SCD